# Patient Record
Sex: FEMALE | Race: WHITE | NOT HISPANIC OR LATINO | Employment: OTHER
[De-identification: names, ages, dates, MRNs, and addresses within clinical notes are randomized per-mention and may not be internally consistent; named-entity substitution may affect disease eponyms.]

---

## 2017-03-16 ENCOUNTER — RECORDS - HEALTHEAST (OUTPATIENT)
Dept: ADMINISTRATIVE | Facility: OTHER | Age: 82
End: 2017-03-16

## 2017-03-17 ENCOUNTER — HOSPITAL ENCOUNTER (OUTPATIENT)
Dept: CT IMAGING | Facility: HOSPITAL | Age: 82
Discharge: HOME | End: 2017-03-17

## 2017-03-17 DIAGNOSIS — R10.32 LLQ ABDOMINAL PAIN: ICD-10-CM

## 2017-03-17 DIAGNOSIS — K57.92 DIVERTICULITIS: ICD-10-CM

## 2017-09-26 ENCOUNTER — RECORDS - HEALTHEAST (OUTPATIENT)
Dept: ADMINISTRATIVE | Facility: OTHER | Age: 82
End: 2017-09-26

## 2017-09-29 ENCOUNTER — HOSPITAL ENCOUNTER (OUTPATIENT)
Dept: CT IMAGING | Facility: HOSPITAL | Age: 82
Discharge: HOME | End: 2017-09-29

## 2017-09-29 DIAGNOSIS — K57.32 DIVERTICULITIS LARGE INTESTINE: ICD-10-CM

## 2018-06-26 ENCOUNTER — RECORDS - HEALTHEAST (OUTPATIENT)
Dept: ADMINISTRATIVE | Facility: OTHER | Age: 83
End: 2018-06-26

## 2018-06-29 ENCOUNTER — HOSPITAL ENCOUNTER (OUTPATIENT)
Dept: CT IMAGING | Facility: HOSPITAL | Age: 83
Discharge: HOME | End: 2018-06-29

## 2018-06-29 DIAGNOSIS — M51.16 LUMBAR DISC DISEASE WITH RADICULOPATHY: ICD-10-CM

## 2018-07-18 ENCOUNTER — RECORDS - HEALTHEAST (OUTPATIENT)
Dept: ADMINISTRATIVE | Facility: OTHER | Age: 83
End: 2018-07-18

## 2018-07-20 ENCOUNTER — HOSPITAL ENCOUNTER (OUTPATIENT)
Dept: CARDIOLOGY | Facility: HOSPITAL | Age: 83
Discharge: HOME | End: 2018-07-20

## 2018-07-20 DIAGNOSIS — M79.89 SWELLING OF LIMB: ICD-10-CM

## 2018-07-20 DIAGNOSIS — R06.09 OTHER FORMS OF DYSPNEA: ICD-10-CM

## 2018-07-20 DIAGNOSIS — R06.09 DOE (DYSPNEA ON EXERTION): ICD-10-CM

## 2018-07-20 ASSESSMENT — MIFFLIN-ST. JEOR: SCORE: 1359.83

## 2018-07-23 LAB
BSA FOR ECHO PROCEDURE: 2.07 M2
CV ECHO HEIGHT: 65 IN
CV ECHO WEIGHT: 207 LBS
DOP CALC LVOT AREA: 3.14 CM2
DOP CALC LVOT DIAMETER: 2 CM
DOP CALC LVOT PEAK VEL: 104 CM/S
DOP CALC LVOT STROKE VOLUME: 71.3 CM3
DOP CALCLVOT PEAK VEL VTI: 22.7 CM
ECHO EJECTION FRACTION ESTIMATED: 65 %
FRACTIONAL SHORTENING: 35.2 % (ref 28–44)
INTERVENTRICULAR SEPTUM IN END DIASTOLE: 1 CM (ref 0.6–0.9)
IVS/PW RATIO: 1
LA AREA 1: 17.3 CM2
LA AREA 2: 16.3 CM2
LEFT ATRIUM LENGTH: 5.4 CM
LEFT ATRIUM VOLUME INDEX: 21.4 ML/M2
LEFT ATRIUM VOLUME: 44.4 ML
LEFT VENTRICLE MASS INDEX: 59.5 G/M2
LEFT VENTRICULAR INTERNAL DIMENSION IN DIASTOLE: 3.92 CM (ref 3.8–5.2)
LEFT VENTRICULAR INTERNAL DIMENSION IN SYSTOLE: 2.54 CM (ref 2.2–3.5)
LEFT VENTRICULAR MASS: 123.1 G
LEFT VENTRICULAR OUTFLOW TRACT MEAN GRADIENT: 2 MMHG
LEFT VENTRICULAR OUTFLOW TRACT MEAN VELOCITY: 66.5 CM/S
LEFT VENTRICULAR OUTFLOW TRACT PEAK GRADIENT: 4 MMHG
LEFT VENTRICULAR POSTERIOR WALL IN END DIASTOLE: 1 CM (ref 0.6–0.9)
LV STROKE VOLUME INDEX: 34.4 ML/M2
MITRAL VALVE DECELERATION SLOPE: 4490 MM/S2
MITRAL VALVE E/A RATIO: 0.8
MITRAL VALVE PRESSURE HALF-TIME: 73 MS
MV AVERAGE E/E' RATIO: 11.9 CM/S
MV DECELERATION TIME: 303 MS
MV E'TISSUE VEL-LAT: 6.09 CM/S
MV E'TISSUE VEL-MED: 9.09 CM/S
MV LATERAL E/E' RATIO: 14.8
MV MEDIAL E/E' RATIO: 9.9
MV PEAK A VELOCITY: 120 CM/S
MV PEAK E VELOCITY: 90.3 CM/S
MV VALVE AREA PRESSURE 1/2 METHOD: 3 CM2
NUC REST DIASTOLIC VOLUME INDEX: 3312 LBS
NUC REST SYSTOLIC VOLUME INDEX: 65 IN
TRICUSPID VALVE ANULAR PLANE SYSTOLIC EXCURSION: 3 CM

## 2021-05-25 ENCOUNTER — RECORDS - HEALTHEAST (OUTPATIENT)
Dept: ADMINISTRATIVE | Facility: CLINIC | Age: 86
End: 2021-05-25

## 2021-05-26 ENCOUNTER — RECORDS - HEALTHEAST (OUTPATIENT)
Dept: ADMINISTRATIVE | Facility: CLINIC | Age: 86
End: 2021-05-26

## 2021-05-27 ENCOUNTER — RECORDS - HEALTHEAST (OUTPATIENT)
Dept: ADMINISTRATIVE | Facility: CLINIC | Age: 86
End: 2021-05-27

## 2021-05-28 ENCOUNTER — RECORDS - HEALTHEAST (OUTPATIENT)
Dept: ADMINISTRATIVE | Facility: CLINIC | Age: 86
End: 2021-05-28

## 2021-05-29 ENCOUNTER — RECORDS - HEALTHEAST (OUTPATIENT)
Dept: ADMINISTRATIVE | Facility: CLINIC | Age: 86
End: 2021-05-29

## 2021-05-30 ENCOUNTER — RECORDS - HEALTHEAST (OUTPATIENT)
Dept: ADMINISTRATIVE | Facility: CLINIC | Age: 86
End: 2021-05-30

## 2021-05-31 ENCOUNTER — RECORDS - HEALTHEAST (OUTPATIENT)
Dept: ADMINISTRATIVE | Facility: CLINIC | Age: 86
End: 2021-05-31

## 2021-06-01 ENCOUNTER — RECORDS - HEALTHEAST (OUTPATIENT)
Dept: ADMINISTRATIVE | Facility: CLINIC | Age: 86
End: 2021-06-01

## 2021-06-01 VITALS — WEIGHT: 207 LBS | BODY MASS INDEX: 34.49 KG/M2 | HEIGHT: 65 IN

## 2021-06-02 ENCOUNTER — RECORDS - HEALTHEAST (OUTPATIENT)
Dept: ADMINISTRATIVE | Facility: CLINIC | Age: 86
End: 2021-06-02

## 2021-06-16 PROBLEM — N17.9 AKI (ACUTE KIDNEY INJURY) (H): Status: ACTIVE | Noted: 2017-09-21

## 2021-06-16 PROBLEM — K57.92 DIVERTICULITIS: Status: ACTIVE | Noted: 2017-09-21

## 2021-06-16 PROBLEM — E87.1 HYPONATREMIA: Status: ACTIVE | Noted: 2017-09-21

## 2021-06-16 PROBLEM — D72.829 LEUKOCYTOSIS, UNSPECIFIED TYPE: Status: ACTIVE | Noted: 2017-09-21

## 2021-07-13 ENCOUNTER — RECORDS - HEALTHEAST (OUTPATIENT)
Dept: ADMINISTRATIVE | Facility: CLINIC | Age: 86
End: 2021-07-13

## 2021-07-21 ENCOUNTER — RECORDS - HEALTHEAST (OUTPATIENT)
Dept: ADMINISTRATIVE | Facility: CLINIC | Age: 86
End: 2021-07-21

## 2022-08-03 ENCOUNTER — TRANSITIONAL CARE UNIT VISIT (OUTPATIENT)
Dept: GERIATRICS | Facility: CLINIC | Age: 87
End: 2022-08-03
Payer: COMMERCIAL

## 2022-08-03 ENCOUNTER — LAB REQUISITION (OUTPATIENT)
Dept: LAB | Facility: CLINIC | Age: 87
End: 2022-08-03
Payer: COMMERCIAL

## 2022-08-03 VITALS
SYSTOLIC BLOOD PRESSURE: 120 MMHG | DIASTOLIC BLOOD PRESSURE: 67 MMHG | HEART RATE: 99 BPM | TEMPERATURE: 97.3 F | OXYGEN SATURATION: 93 % | RESPIRATION RATE: 16 BRPM

## 2022-08-03 DIAGNOSIS — I10 ESSENTIAL (PRIMARY) HYPERTENSION: ICD-10-CM

## 2022-08-03 DIAGNOSIS — Z86.73 HISTORY OF TIA (TRANSIENT ISCHEMIC ATTACK) AND STROKE: ICD-10-CM

## 2022-08-03 DIAGNOSIS — G31.84 MILD COGNITIVE IMPAIRMENT: ICD-10-CM

## 2022-08-03 DIAGNOSIS — N18.30 STAGE 3 CHRONIC KIDNEY DISEASE, UNSPECIFIED WHETHER STAGE 3A OR 3B CKD (H): ICD-10-CM

## 2022-08-03 DIAGNOSIS — I10 HYPERTENSION, UNSPECIFIED TYPE: ICD-10-CM

## 2022-08-03 DIAGNOSIS — N30.00 ACUTE CYSTITIS WITHOUT HEMATURIA: Primary | ICD-10-CM

## 2022-08-03 PROBLEM — I71.40 AAA (ABDOMINAL AORTIC ANEURYSM) (H): Status: ACTIVE | Noted: 2022-08-03

## 2022-08-03 PROBLEM — E53.8 B12 DEFICIENCY: Status: ACTIVE | Noted: 2022-02-23

## 2022-08-03 PROBLEM — M26.629 TMJPDS (TEMPOROMANDIBULAR JOINT PAIN DYSFUNCTION SYNDROME): Status: ACTIVE | Noted: 2022-08-03

## 2022-08-03 PROBLEM — E66.9 OBESITY: Status: ACTIVE | Noted: 2022-08-03

## 2022-08-03 PROBLEM — N36.8 PROLAPSED URETHRAL MUCOSA: Status: ACTIVE | Noted: 2021-04-13

## 2022-08-03 PROBLEM — N81.10 FEMALE BLADDER PROLAPSE, ACQUIRED: Status: ACTIVE | Noted: 2022-08-03

## 2022-08-03 PROBLEM — M47.816 DJD (DEGENERATIVE JOINT DISEASE), LUMBAR: Status: ACTIVE | Noted: 2022-08-03

## 2022-08-03 PROBLEM — N95.2 POSTMENOPAUSAL ATROPHIC VAGINITIS: Status: ACTIVE | Noted: 2021-04-13

## 2022-08-03 PROBLEM — R60.0 EDEMA OF LEFT LOWER LEG DUE TO PERIPHERAL VENOUS INSUFFICIENCY: Status: ACTIVE | Noted: 2018-07-25

## 2022-08-03 PROBLEM — H35.30 MACULAR DEGENERATION: Status: ACTIVE | Noted: 2022-08-03

## 2022-08-03 PROBLEM — G45.9 TIA (TRANSIENT ISCHEMIC ATTACK): Status: ACTIVE | Noted: 2022-08-03

## 2022-08-03 PROBLEM — R76.11 POSITIVE PPD: Status: ACTIVE | Noted: 2022-08-03

## 2022-08-03 PROBLEM — K11.20 SIALADENITIS: Status: ACTIVE | Noted: 2022-08-03

## 2022-08-03 PROBLEM — E78.00 PURE HYPERCHOLESTEROLEMIA: Status: ACTIVE | Noted: 2022-08-03

## 2022-08-03 PROBLEM — I63.9 CEREBRAL INFARCTION (H): Status: ACTIVE | Noted: 2022-08-03

## 2022-08-03 PROBLEM — F41.9 ANXIETY: Status: ACTIVE | Noted: 2022-08-03

## 2022-08-03 PROBLEM — J30.9 ALLERGIC RHINITIS: Status: ACTIVE | Noted: 2022-08-03

## 2022-08-03 PROBLEM — I87.2 EDEMA OF LEFT LOWER LEG DUE TO PERIPHERAL VENOUS INSUFFICIENCY: Status: ACTIVE | Noted: 2018-07-25

## 2022-08-03 PROBLEM — I72.3 ANEURYSM OF RIGHT ILIAC ARTERY (H): Status: ACTIVE | Noted: 2019-11-10

## 2022-08-03 PROBLEM — L57.0 ACTINIC KERATOSIS: Status: ACTIVE | Noted: 2022-08-03

## 2022-08-03 PROBLEM — R73.03 PREDIABETES: Status: ACTIVE | Noted: 2022-08-03

## 2022-08-03 PROCEDURE — 99309 SBSQ NF CARE MODERATE MDM 30: CPT | Performed by: NURSE PRACTITIONER

## 2022-08-03 RX ORDER — MENTHOL AND METHYL SALICYLATE 10; 30 G/100G; G/100G
CREAM TOPICAL 4 TIMES DAILY PRN
COMMUNITY
Start: 2022-11-15

## 2022-08-03 RX ORDER — UREA 10 %
500 LOTION (ML) TOPICAL DAILY
COMMUNITY
Start: 2022-11-14

## 2022-08-03 RX ORDER — GABAPENTIN 300 MG/1
300 CAPSULE ORAL 2 TIMES DAILY
COMMUNITY
Start: 2022-11-14

## 2022-08-03 RX ORDER — POTASSIUM CHLORIDE 1.5 G/1.58G
20 POWDER, FOR SOLUTION ORAL DAILY
COMMUNITY

## 2022-08-03 RX ORDER — ANTIOX #8/OM3/DHA/EPA/LUT/ZEAX 250-2.5 MG
1 CAPSULE ORAL 2 TIMES DAILY
COMMUNITY
Start: 2022-11-14

## 2022-08-03 RX ORDER — POLYVINYL ALCOHOL, POVIDONE 14; 6 MG/ML; MG/ML
2 SOLUTION/ DROPS OPHTHALMIC 2 TIMES DAILY
COMMUNITY
Start: 2022-11-15

## 2022-08-03 RX ORDER — FUROSEMIDE 40 MG
40 TABLET ORAL DAILY
COMMUNITY
End: 2023-01-14

## 2022-08-03 RX ORDER — MIRTAZAPINE 30 MG/1
30 TABLET, FILM COATED ORAL AT BEDTIME
COMMUNITY
End: 2023-01-14

## 2022-08-03 RX ORDER — BENZOCAINE/MENTHOL 6 MG-10 MG
LOZENGE MUCOUS MEMBRANE 2 TIMES DAILY
COMMUNITY

## 2022-08-03 NOTE — LETTER
8/3/2022        RE: Rae Yates  [address] [facility] [address]        [facility]    Code Status:  FULL CODE   Visit Type:   Chief Complaint   Patient presents with     Nursing Home Acute     TCU Follow up     Facility:  [facility] ([facility]) [[id number]]           Transitional Care Course: Rae Yates is a 89 year old female who I am seeing today for admit to the TCU.  Patient recently hospitalized on 7/28/2022.  Patient hospitalized at [facility] secondary to UTI.  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  Per the records patient tripped and put her arms out to stop her self and fell forward hitting her head and landing on the left side of her body.  Patient reported crying for help for several hours.  She had had increasing weakness over the last few weeks prior to her fall including poor oral intake and urinary incontinence.  She does have some urinary incontinence at baseline.  She reported her edema was increased in her left leg.    On today's visit patient is sitting up in wheelchair.  Her daughter is present on exam.  She was previously living in an independent apartment.  She does have some underlying cognitive impairment however answers questions appropriately.  Patient with recent fall secondary to weakness and was found to have UTI and hyponatremia.  There was some confusion to the circumstances around her fall.  No syncope.  Patient's hydrochlorothiazide was stopped.  She was treated with fluids and sodium improved.  Urine culture with pansensitive Klebsiella.  She did complete a course of ceftriaxone.  Patient denies any burning or pain with urination on today's visit.  She does have some underlying urinary incontinence.  Essential hypertension.  Blood pressure appears satisfactory.   Her lisinopril was increased during hospitalization and amlodipine added.  Chronic kidney disease stage III.  Creatinine at baseline of 1-1.2.  History of TIA/carotid and iliac aneurysm.  Stable.  Mild cognitive impairment.  Reports some pain in her chin.  She does have some bruising there.  She has bruising along the left side of her body.  Overall body aches and pains.  Continues on Tylenol.  She reports she is eating and drinking well.  She does report some increased pain in that left shoulder with some limitation.    Active Ambulatory Problems     Diagnosis Date Noted     Prolapsed urethral mucosa 04/13/2021     Postmenopausal atrophic vaginitis 04/13/2021     Joint Pain, Localized In The Hip      Hypertension      Diverticulosis      Diverticulitis 09/21/2017     Leukocytosis, unspecified type 09/21/2017     Hyponatremia 09/21/2017     SEGUNDO (acute kidney injury) (H) 09/21/2017     Multiple allergies      CKD (chronic kidney disease) stage 3, GFR 30-59 ml/min (H) 11/09/2018     Chronic pain 09/13/2011     Cerebral infarction (H) 08/03/2022     Plantar fasciitis 04/09/2012     B12 deficiency 02/23/2022     Anxiety 08/03/2022     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     Allergic rhinitis 08/03/2022     Actinic keratosis 08/03/2022     AAA (abdominal aortic aneurysm) (H) 08/03/2022     DJD (degenerative joint disease) of knee 09/19/2012     DJD (degenerative joint disease), lumbar 08/03/2022     Edema of left lower leg due to peripheral venous insufficiency 07/25/2018     Emphysema of lung (H) 02/06/2015     Female bladder prolapse, acquired 08/03/2022     FHx: AAA 11/01/1995     Macular degeneration 08/03/2022     Mild cognitive impairment with memory loss 04/22/2022     Obesity 08/03/2022     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     Prediabetes 08/03/2022     Pulmonary nodule 02/02/2015     Pure hypercholesterolemia 08/03/2022     Right internal carotid artery aneurysm  "10/12/2015     Sciatica of left side 12/04/2013     Sialadenitis 08/03/2022     TIA (transient ischemic attack) 08/03/2022     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    Macrobid  Macrobid       Prednisone Other (See Comments)     Colon bleeding     Sulfamethoxazole-Trimethoprim Dizziness and Shortness Of Breath     Sulfamethoxazole-Trimethoprim [Sulfamethoxazole W/Trimethoprim] Dizziness and Shortness Of Breath     Cephalexin Nausea     NAUSEA HEADACHE, Keflex  NAUSEA HEADACHE  Keflex  NAUSEA HEADACHE  Keflex  NAUSEA HEADACHE  Keflex       Clindamycin Headache     Bloat  HA  heartburn  Bloat  HA  heartburn  Bloat  HA  heartburn     Clopidogrel Unknown and Diarrhea     PLAVIX  EPISTAAXIS  PLAVIX  EPISTAAXIS    PLAVIX  EPISTAAXIS       Clopidogrel Bisulfate [Clopidogrel] Diarrhea     PLAVIX, EPISTAAXIS     Cortisone Other (See Comments)     Other reaction(s): Flushing     Escitalopram Unknown     Other reaction(s): *Unknown     Levofloxacin Nausea     Weak, nausea, diarrhea  Weak, nausea, diarrhea  Weak, nausea, diarrhea     Metronidazole Unknown     Nitrofurantoin Monohyd/M-Cryst [Nitrofurantoin] Unknown     \"Spacey\" \"Funny\" sensation throughout the body/did not tolerate; No Rash.       Nitroimidazoles [Metronidazole] Nausea     FLAGYL, Weak, nausea, diarrhea     Nsaids (Non-Steroidal Anti-Inflammatory Drug) [Nsaids] Dizziness and Nausea     several     Olmesartan Dizziness     BENICAR  BENICAR    BENICAR     Pravastatin Headache and Nausea     Simvastatin Nausea     Weak  Weak    Weak     Rifampin Nausea and Vomiting     With fatigue, loss of " appetite       All Meds and Allergies reviewed in the record at the facility and is the most up-to-date.    Post Discharge Medication Reconciliation Status: discharge medications reconciled and changed, per note/orders  Current Outpatient Medications   Medication Sig     acetaminophen (TYLENOL) 500 MG tablet Take 500 mg by mouth every 4 hours as needed  mg; amt: 500mg PO HS; oral  Special Instructions: Max dose is 4000mg in 24     cyanocobalamin (VITAMIN B-12) 500 MCG tablet Take 500 mcg by mouth daily     furosemide (LASIX) 40 MG tablet Take 40 mg by mouth daily     gabapentin (NEURONTIN) 300 MG capsule Take 300 mg by mouth 2 times daily     hydrocortisone (CORTAID) 1 % external cream Apply topically 2 times daily     lisinopril (PRINIVIL,ZESTRIL) 10 MG tablet [LISINOPRIL (PRINIVIL,ZESTRIL) 10 MG TABLET] Take 1 tablet (10 mg total) by mouth 2 (two) times a day. (Patient taking differently: Take 40 mg by mouth daily)     Menthol-Methyl Salicylate (ICY HOT EXTRA STRENGTH) 10-30 % CREA Externally apply topically 3 times daily as needed     mirtazapine (REMERON) 30 MG tablet Take 30 mg by mouth At Bedtime     Multiple Vitamins-Minerals (PRESERVISION AREDS 2) CAPS Take 1 capsule by mouth 2 times daily preserVision AREDS-2 (vit c,e-le-mrcjf-lutein-zeaxan 250-90-40-1     polyvinyl alcohol-povidone PF (REFRESH) 1.4-0.6 % ophthalmic solution Place 1-2 drops into both eyes every 4 hours as needed for irritation     potassium chloride (KLOR-CON) 20 MEQ packet Take 20 mEq by mouth daily     No current facility-administered medications for this visit.       REVIEW OF SYSTEMS:   Review of Systems  10 point review of systems reviewed.  Pertinent positives in the HPI.    PHYSICAL EXAMINATION:  Physical Exam     Vital signs: /67   Pulse 99   Temp 97.3  F (36.3  C)   Resp 16   SpO2 93%   General: Awake, Alert, =appropriately, follows simple commands, conversant  HEENT:PERRLA, Pink conjunctiva, anicteric sclerae,  moist oral mucosa.  Bruising under her chin.  NECK: Supple  CVS:  S1  S2, without murmur or gallop.   LUNG: Clear to auscultation, No wheezes, rales or rhonci.  BACK: No kyphosis of the thoracic spine  ABDOMEN: Soft, nontender to palpation, with positive bowel sounds  EXTREMITIES: Moves both upper and lower extremities with limitation to the left shoulder, 1+ pedal edema greater on the left, no calf tenderness  SKIN: Bruising all along the left side of her body.  NEUROLOGIC: Intact, pulses palpable  PSYCHIATRIC: Cognitive impairment noted.      Labs:  All labs reviewed in the nursing home record and Epic   @  Lab Results   Component Value Date    WBC 7.8 08/04/2022     Lab Results   Component Value Date    RBC 3.48 08/04/2022     Lab Results   Component Value Date    HGB 10.7 08/04/2022     Lab Results   Component Value Date    HCT 33.4 08/04/2022     Lab Results   Component Value Date    MCV 96 08/04/2022     Lab Results   Component Value Date    MCH 30.7 08/04/2022     Lab Results   Component Value Date    MCHC 32.0 08/04/2022     Lab Results   Component Value Date    RDW 13.9 08/04/2022     Lab Results   Component Value Date     08/04/2022        @Last Comprehensive Metabolic Panel:  Sodium   Date Value Ref Range Status   08/04/2022 135 (L) 136 - 145 mmol/L Final     Potassium   Date Value Ref Range Status   08/04/2022 5.1 3.4 - 5.3 mmol/L Final   10/22/2019 4.4 3.5 - 5.0 mmol/L Final     Chloride   Date Value Ref Range Status   08/04/2022 97 (L) 98 - 107 mmol/L Final   10/22/2019 96 (L) 98 - 107 mmol/L Final     Carbon Dioxide (CO2)   Date Value Ref Range Status   08/04/2022 27 22 - 29 mmol/L Final   10/22/2019 25 22 - 31 mmol/L Final     Anion Gap   Date Value Ref Range Status   08/04/2022 11 7 - 15 mmol/L Final   10/22/2019 12 5 - 18 mmol/L Final     Glucose   Date Value Ref Range Status   08/04/2022 97 70 - 99 mg/dL Final   10/22/2019 115 70 - 125 mg/dL Final     Urea Nitrogen   Date Value Ref Range  Status   08/04/2022 37.7 (H) 8.0 - 23.0 mg/dL Final   10/22/2019 14 8 - 28 mg/dL Final     Creatinine   Date Value Ref Range Status   08/04/2022 1.41 (H) 0.51 - 0.95 mg/dL Final     GFR Estimate   Date Value Ref Range Status   08/04/2022 35 (L) >60 mL/min/1.73m2 Final     Comment:     Effective December 21, 2021 eGFRcr in adults is calculated using the 2021 CKD-EPI creatinine equation which includes age and gender (Eren et al., NEJ, DOI: 10.1056/NRBTob4123588)   10/22/2019 49 (L) >60 mL/min/1.73m2 Final     Calcium   Date Value Ref Range Status   08/04/2022 9.7 (H) 8.2 - 9.6 mg/dL Final     Assessment/plan:      ICD-10-CM    1. Acute cystitis without hematuria  N30.00  patient has completed her oral antibiotics.  Encourage fluids.  She does have some urinary incontinence at baseline.  Follow-up CBC.   2. Stage 3 chronic kidney disease, unspecified whether stage 3a or 3b CKD (H)  N18.30  Follow-up BMP.   3. Hypertension, unspecified type  I10  satisfactory control.   4. History of TIA (transient ischemic attack) and stroke  Z86.73  Speech therapy to eval and treat.   5. Mild cognitive impairment  G31.84  Speech therapy to eval and treat.     Okay for PT, OT, speech therapy to eval and treat.      This note has been dictated using voice recognition software. Any grammatical or context distortions are unintentional and inherent to the software    Electronically signed by: Farzana Garcia CNP           Sincerely,        Farzana Garcia, NP

## 2022-08-04 LAB
ANION GAP SERPL CALCULATED.3IONS-SCNC: 11 MMOL/L (ref 7–15)
BUN SERPL-MCNC: 37.7 MG/DL (ref 8–23)
CALCIUM SERPL-MCNC: 9.7 MG/DL (ref 8.2–9.6)
CHLORIDE SERPL-SCNC: 97 MMOL/L (ref 98–107)
CREAT SERPL-MCNC: 1.41 MG/DL (ref 0.51–0.95)
DEPRECATED HCO3 PLAS-SCNC: 27 MMOL/L (ref 22–29)
ERYTHROCYTE [DISTWIDTH] IN BLOOD BY AUTOMATED COUNT: 13.9 % (ref 10–15)
GFR SERPL CREATININE-BSD FRML MDRD: 35 ML/MIN/1.73M2
GLUCOSE SERPL-MCNC: 97 MG/DL (ref 70–99)
HCT VFR BLD AUTO: 33.4 % (ref 35–47)
HGB BLD-MCNC: 10.7 G/DL (ref 11.7–15.7)
MCH RBC QN AUTO: 30.7 PG (ref 26.5–33)
MCHC RBC AUTO-ENTMCNC: 32 G/DL (ref 31.5–36.5)
MCV RBC AUTO: 96 FL (ref 78–100)
PLATELET # BLD AUTO: 339 10E3/UL (ref 150–450)
POTASSIUM SERPL-SCNC: 5.1 MMOL/L (ref 3.4–5.3)
RBC # BLD AUTO: 3.48 10E6/UL (ref 3.8–5.2)
SODIUM SERPL-SCNC: 135 MMOL/L (ref 136–145)
WBC # BLD AUTO: 7.8 10E3/UL (ref 4–11)

## 2022-08-04 PROCEDURE — 36415 COLL VENOUS BLD VENIPUNCTURE: CPT | Mod: ORL | Performed by: NURSE PRACTITIONER

## 2022-08-04 PROCEDURE — 85027 COMPLETE CBC AUTOMATED: CPT | Mod: ORL | Performed by: NURSE PRACTITIONER

## 2022-08-04 PROCEDURE — P9604 ONE-WAY ALLOW PRORATED TRIP: HCPCS | Mod: ORL | Performed by: NURSE PRACTITIONER

## 2022-08-04 PROCEDURE — 80048 BASIC METABOLIC PNL TOTAL CA: CPT | Mod: ORL | Performed by: NURSE PRACTITIONER

## 2022-08-05 NOTE — PROGRESS NOTES
[facility]    Code Status:  FULL CODE   Visit Type:   Chief Complaint   Patient presents with     Nursing Home Acute     TCU Follow up     Facility:  [facility] ([facility]) [[id number]]           Transitional Care Course: Rae Yates is a 89 year old female who I am seeing today for admit to the TCU.  Patient recently hospitalized on 7/28/2022.  Patient hospitalized at [facility] secondary to UTI.  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  Per the records patient tripped and put her arms out to stop her self and fell forward hitting her head and landing on the left side of her body.  Patient reported crying for help for several hours.  She had had increasing weakness over the last few weeks prior to her fall including poor oral intake and urinary incontinence.  She does have some urinary incontinence at baseline.  She reported her edema was increased in her left leg.    On today's visit patient is sitting up in wheelchair.  Her daughter is present on exam.  She was previously living in an independent apartment.  She does have some underlying cognitive impairment however answers questions appropriately.  Patient with recent fall secondary to weakness and was found to have UTI and hyponatremia.  There was some confusion to the circumstances around her fall.  No syncope.  Patient's hydrochlorothiazide was stopped.  She was treated with fluids and sodium improved.  Urine culture with pansensitive Klebsiella.  She did complete a course of ceftriaxone.  Patient denies any burning or pain with urination on today's visit.  She does have some underlying urinary incontinence.  Essential hypertension.  Blood pressure appears satisfactory.  Her lisinopril was increased during hospitalization and amlodipine added.  Chronic  kidney disease stage III.  Creatinine at baseline of 1-1.2.  History of TIA/carotid and iliac aneurysm.  Stable.  Mild cognitive impairment.  Reports some pain in her chin.  She does have some bruising there.  She has bruising along the left side of her body.  Overall body aches and pains.  Continues on Tylenol.  She reports she is eating and drinking well.  She does report some increased pain in that left shoulder with some limitation.    Active Ambulatory Problems     Diagnosis Date Noted     Prolapsed urethral mucosa 04/13/2021     Postmenopausal atrophic vaginitis 04/13/2021     Joint Pain, Localized In The Hip      Hypertension      Diverticulosis      Diverticulitis 09/21/2017     Leukocytosis, unspecified type 09/21/2017     Hyponatremia 09/21/2017     SEGUNDO (acute kidney injury) (H) 09/21/2017     Multiple allergies      CKD (chronic kidney disease) stage 3, GFR 30-59 ml/min (H) 11/09/2018     Chronic pain 09/13/2011     Cerebral infarction (H) 08/03/2022     Plantar fasciitis 04/09/2012     B12 deficiency 02/23/2022     Anxiety 08/03/2022     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     Allergic rhinitis 08/03/2022     Actinic keratosis 08/03/2022     AAA (abdominal aortic aneurysm) (H) 08/03/2022     DJD (degenerative joint disease) of knee 09/19/2012     DJD (degenerative joint disease), lumbar 08/03/2022     Edema of left lower leg due to peripheral venous insufficiency 07/25/2018     Emphysema of lung (H) 02/06/2015     Female bladder prolapse, acquired 08/03/2022     FHx: AAA 11/01/1995     Macular degeneration 08/03/2022     Mild cognitive impairment with memory loss 04/22/2022     Obesity 08/03/2022     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     Prediabetes 08/03/2022     Pulmonary nodule 02/02/2015     Pure hypercholesterolemia 08/03/2022     Right internal carotid artery aneurysm 10/12/2015     Sciatica of left side 12/04/2013     Sialadenitis 08/03/2022     TIA  "(transient ischemic attack) 08/03/2022     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    Macrobid  Macrobid       Prednisone Other (See Comments)     Colon bleeding     Sulfamethoxazole-Trimethoprim Dizziness and Shortness Of Breath     Sulfamethoxazole-Trimethoprim [Sulfamethoxazole W/Trimethoprim] Dizziness and Shortness Of Breath     Cephalexin Nausea     NAUSEA HEADACHE, Keflex  NAUSEA HEADACHE  Keflex  NAUSEA HEADACHE  Keflex  NAUSEA HEADACHE  Keflex       Clindamycin Headache     Bloat  HA  heartburn  Bloat  HA  heartburn  Bloat  HA  heartburn     Clopidogrel Unknown and Diarrhea     PLAVIX  EPISTAAXIS  PLAVIX  EPISTAAXIS    PLAVIX  EPISTAAXIS       Clopidogrel Bisulfate [Clopidogrel] Diarrhea     PLAVIX, EPISTAAXIS     Cortisone Other (See Comments)     Other reaction(s): Flushing     Escitalopram Unknown     Other reaction(s): *Unknown     Levofloxacin Nausea     Weak, nausea, diarrhea  Weak, nausea, diarrhea  Weak, nausea, diarrhea     Metronidazole Unknown     Nitrofurantoin Monohyd/M-Cryst [Nitrofurantoin] Unknown     \"Spacey\" \"Funny\" sensation throughout the body/did not tolerate; No Rash.       Nitroimidazoles [Metronidazole] Nausea     FLAGYL, Weak, nausea, diarrhea     Nsaids (Non-Steroidal Anti-Inflammatory Drug) [Nsaids] Dizziness and Nausea     several     Olmesartan Dizziness     BENICAR  BENICAR    BENICAR     Pravastatin Headache and Nausea     Simvastatin Nausea     Weak  Weak    Weak     Rifampin Nausea and Vomiting     With fatigue, loss of appetite       All Meds and Allergies reviewed in the record at the facility and is the " most up-to-date.    Post Discharge Medication Reconciliation Status: discharge medications reconciled and changed, per note/orders  Current Outpatient Medications   Medication Sig     acetaminophen (TYLENOL) 500 MG tablet Take 500 mg by mouth every 4 hours as needed  mg; amt: 500mg PO HS; oral  Special Instructions: Max dose is 4000mg in 24     cyanocobalamin (VITAMIN B-12) 500 MCG tablet Take 500 mcg by mouth daily     furosemide (LASIX) 40 MG tablet Take 40 mg by mouth daily     gabapentin (NEURONTIN) 300 MG capsule Take 300 mg by mouth 2 times daily     hydrocortisone (CORTAID) 1 % external cream Apply topically 2 times daily     lisinopril (PRINIVIL,ZESTRIL) 10 MG tablet [LISINOPRIL (PRINIVIL,ZESTRIL) 10 MG TABLET] Take 1 tablet (10 mg total) by mouth 2 (two) times a day. (Patient taking differently: Take 40 mg by mouth daily)     Menthol-Methyl Salicylate (ICY HOT EXTRA STRENGTH) 10-30 % CREA Externally apply topically 3 times daily as needed     mirtazapine (REMERON) 30 MG tablet Take 30 mg by mouth At Bedtime     Multiple Vitamins-Minerals (PRESERVISION AREDS 2) CAPS Take 1 capsule by mouth 2 times daily preserVision AREDS-2 (vit c,o-ev-qffkp-lutein-zeaxan 250-90-40-1     polyvinyl alcohol-povidone PF (REFRESH) 1.4-0.6 % ophthalmic solution Place 1-2 drops into both eyes every 4 hours as needed for irritation     potassium chloride (KLOR-CON) 20 MEQ packet Take 20 mEq by mouth daily     No current facility-administered medications for this visit.       REVIEW OF SYSTEMS:   Review of Systems  10 point review of systems reviewed.  Pertinent positives in the HPI.    PHYSICAL EXAMINATION:  Physical Exam     Vital signs: /67   Pulse 99   Temp 97.3  F (36.3  C)   Resp 16   SpO2 93%   General: Awake, Alert, =appropriately, follows simple commands, conversant  HEENT:PERRLA, Pink conjunctiva, anicteric sclerae, moist oral mucosa.  Bruising under her chin.  NECK: Supple  CVS:  S1  S2, without murmur or  gallop.   LUNG: Clear to auscultation, No wheezes, rales or rhonci.  BACK: No kyphosis of the thoracic spine  ABDOMEN: Soft, nontender to palpation, with positive bowel sounds  EXTREMITIES: Moves both upper and lower extremities with limitation to the left shoulder, 1+ pedal edema greater on the left, no calf tenderness  SKIN: Bruising all along the left side of her body.  NEUROLOGIC: Intact, pulses palpable  PSYCHIATRIC: Cognitive impairment noted.      Labs:  All labs reviewed in the nursing home record and Epic   @  Lab Results   Component Value Date    WBC 7.8 08/04/2022     Lab Results   Component Value Date    RBC 3.48 08/04/2022     Lab Results   Component Value Date    HGB 10.7 08/04/2022     Lab Results   Component Value Date    HCT 33.4 08/04/2022     Lab Results   Component Value Date    MCV 96 08/04/2022     Lab Results   Component Value Date    MCH 30.7 08/04/2022     Lab Results   Component Value Date    MCHC 32.0 08/04/2022     Lab Results   Component Value Date    RDW 13.9 08/04/2022     Lab Results   Component Value Date     08/04/2022        @Last Comprehensive Metabolic Panel:  Sodium   Date Value Ref Range Status   08/04/2022 135 (L) 136 - 145 mmol/L Final     Potassium   Date Value Ref Range Status   08/04/2022 5.1 3.4 - 5.3 mmol/L Final   10/22/2019 4.4 3.5 - 5.0 mmol/L Final     Chloride   Date Value Ref Range Status   08/04/2022 97 (L) 98 - 107 mmol/L Final   10/22/2019 96 (L) 98 - 107 mmol/L Final     Carbon Dioxide (CO2)   Date Value Ref Range Status   08/04/2022 27 22 - 29 mmol/L Final   10/22/2019 25 22 - 31 mmol/L Final     Anion Gap   Date Value Ref Range Status   08/04/2022 11 7 - 15 mmol/L Final   10/22/2019 12 5 - 18 mmol/L Final     Glucose   Date Value Ref Range Status   08/04/2022 97 70 - 99 mg/dL Final   10/22/2019 115 70 - 125 mg/dL Final     Urea Nitrogen   Date Value Ref Range Status   08/04/2022 37.7 (H) 8.0 - 23.0 mg/dL Final   10/22/2019 14 8 - 28 mg/dL Final      Creatinine   Date Value Ref Range Status   08/04/2022 1.41 (H) 0.51 - 0.95 mg/dL Final     GFR Estimate   Date Value Ref Range Status   08/04/2022 35 (L) >60 mL/min/1.73m2 Final     Comment:     Effective December 21, 2021 eGFRcr in adults is calculated using the 2021 CKD-EPI creatinine equation which includes age and gender (Eren et al., NE, DOI: 10.1056/QWGCod7831635)   10/22/2019 49 (L) >60 mL/min/1.73m2 Final     Calcium   Date Value Ref Range Status   08/04/2022 9.7 (H) 8.2 - 9.6 mg/dL Final     Assessment/plan:      ICD-10-CM    1. Acute cystitis without hematuria  N30.00  patient has completed her oral antibiotics.  Encourage fluids.  She does have some urinary incontinence at baseline.  Follow-up CBC.   2. Stage 3 chronic kidney disease, unspecified whether stage 3a or 3b CKD (H)  N18.30  Follow-up BMP.   3. Hypertension, unspecified type  I10  satisfactory control.   4. History of TIA (transient ischemic attack) and stroke  Z86.73  Speech therapy to eval and treat.   5. Mild cognitive impairment  G31.84  Speech therapy to eval and treat.     Okay for PT, OT, speech therapy to eval and treat.      This note has been dictated using voice recognition software. Any grammatical or context distortions are unintentional and inherent to the software    Electronically signed by: Farzana Garcia, CNP

## 2022-08-09 ENCOUNTER — TRANSITIONAL CARE UNIT VISIT (OUTPATIENT)
Dept: GERIATRICS | Facility: CLINIC | Age: 87
End: 2022-08-09
Payer: COMMERCIAL

## 2022-08-09 VITALS
HEART RATE: 80 BPM | WEIGHT: 175.6 LBS | TEMPERATURE: 97.5 F | RESPIRATION RATE: 14 BRPM | BODY MASS INDEX: 29.22 KG/M2 | SYSTOLIC BLOOD PRESSURE: 144 MMHG | OXYGEN SATURATION: 96 % | DIASTOLIC BLOOD PRESSURE: 75 MMHG

## 2022-08-09 DIAGNOSIS — N18.30 STAGE 3 CHRONIC KIDNEY DISEASE, UNSPECIFIED WHETHER STAGE 3A OR 3B CKD (H): ICD-10-CM

## 2022-08-09 DIAGNOSIS — I10 HYPERTENSION, UNSPECIFIED TYPE: ICD-10-CM

## 2022-08-09 DIAGNOSIS — N30.00 ACUTE CYSTITIS WITHOUT HEMATURIA: Primary | ICD-10-CM

## 2022-08-09 DIAGNOSIS — S30.810A EXCORIATION OF BUTTOCK, INITIAL ENCOUNTER: ICD-10-CM

## 2022-08-09 PROCEDURE — 99309 SBSQ NF CARE MODERATE MDM 30: CPT | Performed by: NURSE PRACTITIONER

## 2022-08-09 NOTE — LETTER
8/9/2022        RE: Rae Yates  [address] [place] [address]        [facility]    Code Status:  FULL CODE   Visit Type:   Chief Complaint   Patient presents with     Nursing Home Acute     TCU Follow up     Facility:  [facility] ([facility]) [[id number]]           Transitional Care Course: Rae Yates is a 89 year old female who I am seeing today for follow-up on the TCU.  Patient recently hospitalized on 7/28/2022.  Patient hospitalized at [facility] secondary to UTI.  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  Per the records patient tripped and put her arms out to stop her self and fell forward hitting her head and landing on the left side of her body.  Patient reported crying for help for several hours.  She had had increasing weakness over the last few weeks prior to her fall including poor oral intake and urinary incontinence.  She does have some urinary incontinence at baseline.  She reported her edema was increased in her left leg.    On today's visit patient is sitting up in wheelchair.  Her daughter is present on exam. Pt with recent UTI. Pt has completed her antibiotics. She is voiding adequately. Pt denies any burning or pain with urination.  Patient with excoriation to bilateral buttocks.  Denuded skin noted.  No yeast present.  Calmoseptine applied today.  Hist patient with mild underlying cognitive impairment.  ory of underlying urinary incontinence.  Hypertension.  Blood pressure satisfactory.  Stage III chronic kidney disease.  Creatinine stable.  Bruising to her chin and left side improving.  She continues on Tylenol for pain.    Active Ambulatory Problems     Diagnosis Date Noted     Prolapsed urethral mucosa 04/13/2021     Postmenopausal atrophic vaginitis 04/13/2021      "Joint Pain, Localized In The Hip      Hypertension      Diverticulosis      Diverticulitis 09/21/2017     Leukocytosis, unspecified type 09/21/2017     Hyponatremia 09/21/2017     SEGUNDO (acute kidney injury) (H) 09/21/2017     Multiple allergies      CKD (chronic kidney disease) stage 3, GFR 30-59 ml/min (H) 11/09/2018     Chronic pain 09/13/2011     Cerebral infarction (H) 08/03/2022     Plantar fasciitis 04/09/2012     B12 deficiency 02/23/2022     Anxiety 08/03/2022     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     Allergic rhinitis 08/03/2022     Actinic keratosis 08/03/2022     AAA (abdominal aortic aneurysm) (H) 08/03/2022     DJD (degenerative joint disease) of knee 09/19/2012     DJD (degenerative joint disease), lumbar 08/03/2022     Edema of left lower leg due to peripheral venous insufficiency 07/25/2018     Emphysema of lung (H) 02/06/2015     Female bladder prolapse, acquired 08/03/2022     FHx: AAA 11/01/1995     Macular degeneration 08/03/2022     Mild cognitive impairment with memory loss 04/22/2022     Obesity 08/03/2022     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     Prediabetes 08/03/2022     Pulmonary nodule 02/02/2015     Pure hypercholesterolemia 08/03/2022     Right internal carotid artery aneurysm 10/12/2015     Sciatica of left side 12/04/2013     Sialadenitis 08/03/2022     TIA (transient ischemic attack) 08/03/2022     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    Macrobid  Macrobid       Prednisone Other (See Comments)     Colon bleeding     Sulfamethoxazole-Trimethoprim Dizziness and " "Shortness Of Breath     Sulfamethoxazole-Trimethoprim [Sulfamethoxazole W/Trimethoprim] Dizziness and Shortness Of Breath     Cephalexin Nausea     NAUSEA HEADACHE, Keflex  NAUSEA HEADACHE  Keflex  NAUSEA HEADACHE  Keflex  NAUSEA HEADACHE  Keflex       Clindamycin Headache     Bloat  HA  heartburn  Bloat  HA  heartburn  Bloat  HA  heartburn     Clopidogrel Unknown and Diarrhea     PLAVIX  EPISTAAXIS  PLAVIX  EPISTAAXIS    PLAVIX  EPISTAAXIS       Clopidogrel Bisulfate [Clopidogrel] Diarrhea     PLAVIX, EPISTAAXIS     Cortisone Other (See Comments)     Other reaction(s): Flushing     Escitalopram Unknown     Other reaction(s): *Unknown     Levofloxacin Nausea     Weak, nausea, diarrhea  Weak, nausea, diarrhea  Weak, nausea, diarrhea     Metronidazole Unknown     Nitrofurantoin Monohyd/M-Cryst [Nitrofurantoin] Unknown     \"Spacey\" \"Funny\" sensation throughout the body/did not tolerate; No Rash.       Nitroimidazoles [Metronidazole] Nausea     FLAGYL, Weak, nausea, diarrhea     Nsaids (Non-Steroidal Anti-Inflammatory Drug) [Nsaids] Dizziness and Nausea     several     Olmesartan Dizziness     BENICAR  BENICAR    BENICAR     Pravastatin Headache and Nausea     Simvastatin Nausea     Weak  Weak    Weak     Rifampin Nausea and Vomiting     With fatigue, loss of appetite       All Meds and Allergies reviewed in the record at the facility and is the most up-to-date.    Current Outpatient Medications   Medication Sig     acetaminophen (TYLENOL) 500 MG tablet Take 500 mg by mouth every 4 hours as needed  mg; amt: 500mg PO HS; oral  Special Instructions: Max dose is 4000mg in 24     cyanocobalamin (VITAMIN B-12) 500 MCG tablet Take 500 mcg by mouth daily     furosemide (LASIX) 40 MG tablet Take 40 mg by mouth daily     gabapentin (NEURONTIN) 300 MG capsule Take 300 mg by mouth 2 times daily     hydrocortisone (CORTAID) 1 % external cream Apply topically 2 times daily     lisinopril (PRINIVIL,ZESTRIL) 10 MG tablet " [LISINOPRIL (PRINIVIL,ZESTRIL) 10 MG TABLET] Take 1 tablet (10 mg total) by mouth 2 (two) times a day. (Patient taking differently: Take 40 mg by mouth daily)     Menthol-Methyl Salicylate (ICY HOT EXTRA STRENGTH) 10-30 % CREA Externally apply topically 3 times daily as needed     mirtazapine (REMERON) 30 MG tablet Take 30 mg by mouth At Bedtime     Multiple Vitamins-Minerals (PRESERVISION AREDS 2) CAPS Take 1 capsule by mouth 2 times daily preserVision AREDS-2 (vit c,g-vr-adwic-lutein-zeaxan 250-90-40-1     polyvinyl alcohol-povidone PF (REFRESH) 1.4-0.6 % ophthalmic solution Place 1-2 drops into both eyes every 4 hours as needed for irritation     potassium chloride (KLOR-CON) 20 MEQ packet Take 20 mEq by mouth daily     No current facility-administered medications for this visit.       REVIEW OF SYSTEMS:   Review of Systems  10 point review of systems reviewed.  Pertinent positives in the HPI.    PHYSICAL EXAMINATION:  Physical Exam     Vital signs: BP (!) 144/75   Pulse 80   Temp 97.5  F (36.4  C)   Resp 14   Wt 79.7 kg (175 lb 9.6 oz)   SpO2 96%   BMI 29.22 kg/m    General: Awake, Alert,  conversant  HEENT:Pink conjunctiva, anicteric sclerae, moist oral mucosa.  Bruising under her chin improving.  NECK: Supple  CVS:  S1  S2, without murmur or gallop.   LUNG: Clear to auscultation, No wheezes, rales or rhonci.  BACK: No kyphosis of the thoracic spine  ABDOMEN: Soft,  with positive bowel sounds  EXTREMITIES: Moves both upper and lower extremities  1+ pedal edema greater on the left, no calf tenderness  SKIN: Bruising all along the left side of her body improving.  She has excoriation of bilateral buttocks.  Denuded skin noted.    NEUROLOGIC: Intact, pulses palpable  PSYCHIATRIC: Cognitive impairment noted.      Labs:  All labs reviewed in the nursing home record and [employer]   @  Lab Results   Component Value Date    WBC 7.8 08/04/2022     Lab Results   Component Value Date    RBC 3.48 08/04/2022     Lab  Results   Component Value Date    HGB 10.7 08/04/2022     Lab Results   Component Value Date    HCT 33.4 08/04/2022     Lab Results   Component Value Date    MCV 96 08/04/2022     Lab Results   Component Value Date    MCH 30.7 08/04/2022     Lab Results   Component Value Date    MCHC 32.0 08/04/2022     Lab Results   Component Value Date    RDW 13.9 08/04/2022     Lab Results   Component Value Date     08/04/2022        @Last Comprehensive Metabolic Panel:  Sodium   Date Value Ref Range Status   08/04/2022 135 (L) 136 - 145 mmol/L Final     Potassium   Date Value Ref Range Status   08/04/2022 5.1 3.4 - 5.3 mmol/L Final   10/22/2019 4.4 3.5 - 5.0 mmol/L Final     Chloride   Date Value Ref Range Status   08/04/2022 97 (L) 98 - 107 mmol/L Final   10/22/2019 96 (L) 98 - 107 mmol/L Final     Carbon Dioxide (CO2)   Date Value Ref Range Status   08/04/2022 27 22 - 29 mmol/L Final   10/22/2019 25 22 - 31 mmol/L Final     Anion Gap   Date Value Ref Range Status   08/04/2022 11 7 - 15 mmol/L Final   10/22/2019 12 5 - 18 mmol/L Final     Glucose   Date Value Ref Range Status   08/04/2022 97 70 - 99 mg/dL Final   10/22/2019 115 70 - 125 mg/dL Final     Urea Nitrogen   Date Value Ref Range Status   08/04/2022 37.7 (H) 8.0 - 23.0 mg/dL Final   10/22/2019 14 8 - 28 mg/dL Final     Creatinine   Date Value Ref Range Status   08/04/2022 1.41 (H) 0.51 - 0.95 mg/dL Final     GFR Estimate   Date Value Ref Range Status   08/04/2022 35 (L) >60 mL/min/1.73m2 Final     Comment:     Effective December 21, 2021 eGFRcr in adults is calculated using the 2021 CKD-EPI creatinine equation which includes age and gender (Eren wagoner al., NEJM, DOI: 10.1056/GBNSsc4574538)   10/22/2019 49 (L) >60 mL/min/1.73m2 Final     Calcium   Date Value Ref Range Status   08/04/2022 9.7 (H) 8.2 - 9.6 mg/dL Final     Assessment/plan:      ICD-10-CM    1. Acute cystitis without hematuria  N30.00  patient completed her oral antibiotics.  Urinary incontinence at  baseline.  Encourage fluids.   2. Stage 3 chronic kidney disease, unspecified whether stage 3a or 3b CKD (H)  N18.30  Creatinine at baseline.  Continue to monitor.   3. Hypertension, unspecified type  I10  satisfactory control.   4. Excoriation of buttock, initial encounter  S30.810A  Apply Calmoseptine every shift.         This note has been dictated using voice recognition software. Any grammatical or context distortions are unintentional and inherent to the software    Electronically signed by: Farzana Garcia CNP           Sincerely,        Farzana Garcia, NP

## 2022-08-10 NOTE — PROGRESS NOTES
[facility]    Code Status:  FULL CODE   Visit Type:   Chief Complaint   Patient presents with     Nursing Home Acute     TCU Follow up     Facility:  [facility] ([facility]) [[id number]]           Transitional Care Course: Rae Yates is a 89 year old female who I am seeing today for follow-up on the TCU.  Patient recently hospitalized on 7/28/2022.  Patient hospitalized at [facility] secondary to UTI.  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  Per the records patient tripped and put her arms out to stop her self and fell forward hitting her head and landing on the left side of her body.  Patient reported crying for help for several hours.  She had had increasing weakness over the last few weeks prior to her fall including poor oral intake and urinary incontinence.  She does have some urinary incontinence at baseline.  She reported her edema was increased in her left leg.    On today's visit patient is sitting up in wheelchair.  Her daughter is present on exam. Pt with recent UTI. Pt has completed her antibiotics. She is voiding adequately. Pt denies any burning or pain with urination.  Patient with excoriation to bilateral buttocks.  Denuded skin noted.  No yeast present.  Calmoseptine applied today.  Hist patient with mild underlying cognitive impairment.  ory of underlying urinary incontinence.  Hypertension.  Blood pressure satisfactory.  Stage III chronic kidney disease.  Creatinine stable.  Bruising to her chin and left side improving.  She continues on Tylenol for pain.    Active Ambulatory Problems     Diagnosis Date Noted     Prolapsed urethral mucosa 04/13/2021     Postmenopausal atrophic vaginitis 04/13/2021     Joint Pain, Localized In The Hip      Hypertension      Diverticulosis      Diverticulitis  "09/21/2017     Leukocytosis, unspecified type 09/21/2017     Hyponatremia 09/21/2017     SEGUNDO (acute kidney injury) (H) 09/21/2017     Multiple allergies      CKD (chronic kidney disease) stage 3, GFR 30-59 ml/min (H) 11/09/2018     Chronic pain 09/13/2011     Cerebral infarction (H) 08/03/2022     Plantar fasciitis 04/09/2012     B12 deficiency 02/23/2022     Anxiety 08/03/2022     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     Allergic rhinitis 08/03/2022     Actinic keratosis 08/03/2022     AAA (abdominal aortic aneurysm) (H) 08/03/2022     DJD (degenerative joint disease) of knee 09/19/2012     DJD (degenerative joint disease), lumbar 08/03/2022     Edema of left lower leg due to peripheral venous insufficiency 07/25/2018     Emphysema of lung (H) 02/06/2015     Female bladder prolapse, acquired 08/03/2022     FHx: AAA 11/01/1995     Macular degeneration 08/03/2022     Mild cognitive impairment with memory loss 04/22/2022     Obesity 08/03/2022     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     Prediabetes 08/03/2022     Pulmonary nodule 02/02/2015     Pure hypercholesterolemia 08/03/2022     Right internal carotid artery aneurysm 10/12/2015     Sciatica of left side 12/04/2013     Sialadenitis 08/03/2022     TIA (transient ischemic attack) 08/03/2022     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    Macrobid  Macrobid       Prednisone Other (See Comments)     Colon bleeding     Sulfamethoxazole-Trimethoprim Dizziness and Shortness Of Breath     Sulfamethoxazole-Trimethoprim [Sulfamethoxazole W/Trimethoprim] Dizziness " "and Shortness Of Breath     Cephalexin Nausea     NAUSEA HEADACHE, Keflex  NAUSEA HEADACHE  Keflex  NAUSEA HEADACHE  Keflex  NAUSEA HEADACHE  Keflex       Clindamycin Headache     Bloat  HA  heartburn  Bloat  HA  heartburn  Bloat  HA  heartburn     Clopidogrel Unknown and Diarrhea     PLAVIX  EPISTAAXIS  PLAVIX  EPISTAAXIS    PLAVIX  EPISTAAXIS       Clopidogrel Bisulfate [Clopidogrel] Diarrhea     PLAVIX, EPISTAAXIS     Cortisone Other (See Comments)     Other reaction(s): Flushing     Escitalopram Unknown     Other reaction(s): *Unknown     Levofloxacin Nausea     Weak, nausea, diarrhea  Weak, nausea, diarrhea  Weak, nausea, diarrhea     Metronidazole Unknown     Nitrofurantoin Monohyd/M-Cryst [Nitrofurantoin] Unknown     \"Spacey\" \"Funny\" sensation throughout the body/did not tolerate; No Rash.       Nitroimidazoles [Metronidazole] Nausea     FLAGYL, Weak, nausea, diarrhea     Nsaids (Non-Steroidal Anti-Inflammatory Drug) [Nsaids] Dizziness and Nausea     several     Olmesartan Dizziness     BENICAR  BENICAR    BENICAR     Pravastatin Headache and Nausea     Simvastatin Nausea     Weak  Weak    Weak     Rifampin Nausea and Vomiting     With fatigue, loss of appetite       All Meds and Allergies reviewed in the record at the facility and is the most up-to-date.    Current Outpatient Medications   Medication Sig     acetaminophen (TYLENOL) 500 MG tablet Take 500 mg by mouth every 4 hours as needed  mg; amt: 500mg PO HS; oral  Special Instructions: Max dose is 4000mg in 24     cyanocobalamin (VITAMIN B-12) 500 MCG tablet Take 500 mcg by mouth daily     furosemide (LASIX) 40 MG tablet Take 40 mg by mouth daily     gabapentin (NEURONTIN) 300 MG capsule Take 300 mg by mouth 2 times daily     hydrocortisone (CORTAID) 1 % external cream Apply topically 2 times daily     lisinopril (PRINIVIL,ZESTRIL) 10 MG tablet [LISINOPRIL (PRINIVIL,ZESTRIL) 10 MG TABLET] Take 1 tablet (10 mg total) by mouth 2 (two) times a day. " (Patient taking differently: Take 40 mg by mouth daily)     Menthol-Methyl Salicylate (ICY HOT EXTRA STRENGTH) 10-30 % CREA Externally apply topically 3 times daily as needed     mirtazapine (REMERON) 30 MG tablet Take 30 mg by mouth At Bedtime     Multiple Vitamins-Minerals (PRESERVISION AREDS 2) CAPS Take 1 capsule by mouth 2 times daily preserVision AREDS-2 (vit c,w-fp-oguqg-lutein-zeaxan 250-90-40-1     polyvinyl alcohol-povidone PF (REFRESH) 1.4-0.6 % ophthalmic solution Place 1-2 drops into both eyes every 4 hours as needed for irritation     potassium chloride (KLOR-CON) 20 MEQ packet Take 20 mEq by mouth daily     No current facility-administered medications for this visit.       REVIEW OF SYSTEMS:   Review of Systems  10 point review of systems reviewed.  Pertinent positives in the HPI.    PHYSICAL EXAMINATION:  Physical Exam     Vital signs: BP (!) 144/75   Pulse 80   Temp 97.5  F (36.4  C)   Resp 14   Wt 79.7 kg (175 lb 9.6 oz)   SpO2 96%   BMI 29.22 kg/m    General: Awake, Alert,  conversant  HEENT:Pink conjunctiva, anicteric sclerae, moist oral mucosa.  Bruising under her chin improving.  NECK: Supple  CVS:  S1  S2, without murmur or gallop.   LUNG: Clear to auscultation, No wheezes, rales or rhonci.  BACK: No kyphosis of the thoracic spine  ABDOMEN: Soft,  with positive bowel sounds  EXTREMITIES: Moves both upper and lower extremities  1+ pedal edema greater on the left, no calf tenderness  SKIN: Bruising all along the left side of her body improving.  She has excoriation of bilateral buttocks.  Denuded skin noted.    NEUROLOGIC: Intact, pulses palpable  PSYCHIATRIC: Cognitive impairment noted.      Labs:  All labs reviewed in the nursing home record and [email or web address]   @  Lab Results   Component Value Date    WBC 7.8 08/04/2022     Lab Results   Component Value Date    RBC 3.48 08/04/2022     Lab Results   Component Value Date    HGB 10.7 08/04/2022     Lab Results   Component Value Date    HCT 33.4  08/04/2022     Lab Results   Component Value Date    MCV 96 08/04/2022     Lab Results   Component Value Date    MCH 30.7 08/04/2022     Lab Results   Component Value Date    MCHC 32.0 08/04/2022     Lab Results   Component Value Date    RDW 13.9 08/04/2022     Lab Results   Component Value Date     08/04/2022        @Last Comprehensive Metabolic Panel:  Sodium   Date Value Ref Range Status   08/04/2022 135 (L) 136 - 145 mmol/L Final     Potassium   Date Value Ref Range Status   08/04/2022 5.1 3.4 - 5.3 mmol/L Final   10/22/2019 4.4 3.5 - 5.0 mmol/L Final     Chloride   Date Value Ref Range Status   08/04/2022 97 (L) 98 - 107 mmol/L Final   10/22/2019 96 (L) 98 - 107 mmol/L Final     Carbon Dioxide (CO2)   Date Value Ref Range Status   08/04/2022 27 22 - 29 mmol/L Final   10/22/2019 25 22 - 31 mmol/L Final     Anion Gap   Date Value Ref Range Status   08/04/2022 11 7 - 15 mmol/L Final   10/22/2019 12 5 - 18 mmol/L Final     Glucose   Date Value Ref Range Status   08/04/2022 97 70 - 99 mg/dL Final   10/22/2019 115 70 - 125 mg/dL Final     Urea Nitrogen   Date Value Ref Range Status   08/04/2022 37.7 (H) 8.0 - 23.0 mg/dL Final   10/22/2019 14 8 - 28 mg/dL Final     Creatinine   Date Value Ref Range Status   08/04/2022 1.41 (H) 0.51 - 0.95 mg/dL Final     GFR Estimate   Date Value Ref Range Status   08/04/2022 35 (L) >60 mL/min/1.73m2 Final     Comment:     Effective December 21, 2021 eGFRcr in adults is calculated using the 2021 CKD-EPI creatinine equation which includes age and gender (Eren et al., NEJM, DOI: 10.1056/MYJIfw0454235)   10/22/2019 49 (L) >60 mL/min/1.73m2 Final     Calcium   Date Value Ref Range Status   08/04/2022 9.7 (H) 8.2 - 9.6 mg/dL Final     Assessment/plan:      ICD-10-CM    1. Acute cystitis without hematuria  N30.00  patient completed her oral antibiotics.  Urinary incontinence at baseline.  Encourage fluids.   2. Stage 3 chronic kidney disease, unspecified whether stage 3a or 3b CKD  (H)  N18.30  Creatinine at baseline.  Continue to monitor.   3. Hypertension, unspecified type  I10  satisfactory control.   4. Excoriation of buttock, initial encounter  S30.810A  Apply Calmoseptine every shift.         This note has been dictated using voice recognition software. Any grammatical or context distortions are unintentional and inherent to the software    Electronically signed by: Farzana Garcia CNP

## 2022-08-11 ENCOUNTER — TRANSITIONAL CARE UNIT VISIT (OUTPATIENT)
Dept: GERIATRICS | Facility: CLINIC | Age: 87
End: 2022-08-11
Payer: COMMERCIAL

## 2022-08-11 VITALS
OXYGEN SATURATION: 98 % | HEART RATE: 83 BPM | HEIGHT: 66 IN | RESPIRATION RATE: 16 BRPM | TEMPERATURE: 98.2 F | DIASTOLIC BLOOD PRESSURE: 67 MMHG | SYSTOLIC BLOOD PRESSURE: 117 MMHG | BODY MASS INDEX: 28.22 KG/M2 | WEIGHT: 175.6 LBS

## 2022-08-11 DIAGNOSIS — N30.00 ACUTE CYSTITIS WITHOUT HEMATURIA: Primary | ICD-10-CM

## 2022-08-11 DIAGNOSIS — I10 HYPERTENSION, UNSPECIFIED TYPE: ICD-10-CM

## 2022-08-11 DIAGNOSIS — S30.810A EXCORIATION OF BUTTOCK, INITIAL ENCOUNTER: ICD-10-CM

## 2022-08-11 DIAGNOSIS — N18.30 STAGE 3 CHRONIC KIDNEY DISEASE, UNSPECIFIED WHETHER STAGE 3A OR 3B CKD (H): ICD-10-CM

## 2022-08-11 DIAGNOSIS — G31.84 MILD COGNITIVE IMPAIRMENT: ICD-10-CM

## 2022-08-11 PROCEDURE — 99309 SBSQ NF CARE MODERATE MDM 30: CPT | Performed by: NURSE PRACTITIONER

## 2022-08-11 NOTE — PROGRESS NOTES
[facility] GERIATRIC SERVICES    Code Status:  FULL CODE   Visit Type:   Chief Complaint   Patient presents with     Nursing Home Acute     TCU Follow up     Facility:  [facility] ([facility]) [[id number]]           Transitional Care Course: Rae Yates is a 89 year old female who I am seeing today for follow-up on the TCU.  Patient recently hospitalized on 7/28/2022.  Patient hospitalized at [facility] secondary to UTI.  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  Per the records patient tripped and put her arms out to stop her self and fell forward hitting her head and landing on the left side of her body.  Patient reported crying for help for several hours.  She had had increasing weakness over the last few weeks prior to her fall including poor oral intake and urinary incontinence.  She does have some urinary incontinence at baseline.  She reported her edema was increased in her left leg.    Today patient sitting up in wheelchair.  She does have some mild cognitive impairment.  She appears to answer questions appropriately.  Recent UTI.  She is completed her oral antibiotics.  No dysuria frequency burning or pain.  Patient with excoriation to bilateral buttocks.  She is receiving topical treatment.  Stage III chronic kidney disease.  Creatinine stable.  Hypertension.  Blood pressure satisfactory.  She continues on Tylenol for pain.  She was having some increased pain in that left shoulder.  She is receiving pain modalities with improvement.  She was able to raise her left arm overhead.  Patient reports she is eating well and having regular bowel movements.    Active Ambulatory Problems     Diagnosis Date Noted     Prolapsed urethral mucosa 04/13/2021     Postmenopausal atrophic vaginitis 04/13/2021     Joint Pain, Localized In  "The Hip      Hypertension      Diverticulosis      Diverticulitis 09/21/2017     Leukocytosis, unspecified type 09/21/2017     Hyponatremia 09/21/2017     SEGUNDO (acute kidney injury) (H) 09/21/2017     Multiple allergies      CKD (chronic kidney disease) stage 3, GFR 30-59 ml/min (H) 11/09/2018     Chronic pain 09/13/2011     Cerebral infarction (H) 08/03/2022     Plantar fasciitis 04/09/2012     B12 deficiency 02/23/2022     Anxiety 08/03/2022     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     Allergic rhinitis 08/03/2022     Actinic keratosis 08/03/2022     AAA (abdominal aortic aneurysm) (H) 08/03/2022     DJD (degenerative joint disease) of knee 09/19/2012     DJD (degenerative joint disease), lumbar 08/03/2022     Edema of left lower leg due to peripheral venous insufficiency 07/25/2018     Emphysema of lung (H) 02/06/2015     Female bladder prolapse, acquired 08/03/2022     FHx: AAA 11/01/1995     Macular degeneration 08/03/2022     Mild cognitive impairment with memory loss 04/22/2022     Obesity 08/03/2022     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     Prediabetes 08/03/2022     Pulmonary nodule 02/02/2015     Pure hypercholesterolemia 08/03/2022     Right internal carotid artery aneurysm 10/12/2015     Sciatica of left side 12/04/2013     Sialadenitis 08/03/2022     TIA (transient ischemic attack) 08/03/2022     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    Macrobid  Macrobid       Prednisone Other (See Comments)     Colon bleeding     Sulfamethoxazole-Trimethoprim Dizziness and Shortness Of Breath     " "Sulfamethoxazole-Trimethoprim [Sulfamethoxazole W/Trimethoprim] Dizziness and Shortness Of Breath     Cephalexin Nausea     NAUSEA HEADACHE, Keflex  NAUSEA HEADACHE  Keflex  NAUSEA HEADACHE  Keflex  NAUSEA HEADACHE  Keflex       Clindamycin Headache     Bloat  HA  heartburn  Bloat  HA  heartburn  Bloat  HA  heartburn     Clopidogrel Unknown and Diarrhea     PLAVIX  EPISTAAXIS  PLAVIX  EPISTAAXIS    PLAVIX  EPISTAAXIS       Clopidogrel Bisulfate [Clopidogrel] Diarrhea     PLAVIX, EPISTAAXIS     Cortisone Other (See Comments)     Other reaction(s): Flushing     Escitalopram Unknown     Other reaction(s): *Unknown     Levofloxacin Nausea     Weak, nausea, diarrhea  Weak, nausea, diarrhea  Weak, nausea, diarrhea     Metronidazole Unknown     Nitrofurantoin Monohyd/M-Cryst [Nitrofurantoin] Unknown     \"Spacey\" \"Funny\" sensation throughout the body/did not tolerate; No Rash.       Nitroimidazoles [Metronidazole] Nausea     FLAGYL, Weak, nausea, diarrhea     Nsaids (Non-Steroidal Anti-Inflammatory Drug) [Nsaids] Dizziness and Nausea     several     Olmesartan Dizziness     BENICAR  BENICAR    BENICAR     Pravastatin Headache and Nausea     Simvastatin Nausea     Weak  Weak    Weak     Rifampin Nausea and Vomiting     With fatigue, loss of appetite       All Meds and Allergies reviewed in the record at the facility and is the most up-to-date.    Current Outpatient Medications   Medication Sig     acetaminophen (TYLENOL) 500 MG tablet Take 500 mg by mouth every 4 hours as needed  mg; amt: 500mg PO HS; oral  Special Instructions: Max dose is 4000mg in 24     cyanocobalamin (VITAMIN B-12) 500 MCG tablet Take 500 mcg by mouth daily     furosemide (LASIX) 40 MG tablet Take 40 mg by mouth daily     gabapentin (NEURONTIN) 300 MG capsule Take 300 mg by mouth 2 times daily     hydrocortisone (CORTAID) 1 % external cream Apply topically 2 times daily     lisinopril (PRINIVIL,ZESTRIL) 10 MG tablet [LISINOPRIL (PRINIVIL,ZESTRIL) " "10 MG TABLET] Take 1 tablet (10 mg total) by mouth 2 (two) times a day. (Patient taking differently: Take 40 mg by mouth daily)     Menthol-Methyl Salicylate (ICY HOT EXTRA STRENGTH) 10-30 % CREA Externally apply topically 3 times daily as needed     mirtazapine (REMERON) 30 MG tablet Take 30 mg by mouth At Bedtime     Multiple Vitamins-Minerals (PRESERVISION AREDS 2) CAPS Take 1 capsule by mouth 2 times daily preserVision AREDS-2 (vit c,w-kr-pgqfq-lutein-zeaxan 250-90-40-1     polyvinyl alcohol-povidone PF (REFRESH) 1.4-0.6 % ophthalmic solution Place 1-2 drops into both eyes every 4 hours as needed for irritation     potassium chloride (KLOR-CON) 20 MEQ packet Take 20 mEq by mouth daily     No current facility-administered medications for this visit.       REVIEW OF SYSTEMS:   Review of Systems  7 point review of systems reviewed.  Pertinent positives in the HPI.    PHYSICAL EXAMINATION:  Physical Exam     Vital signs: /67   Pulse 83   Temp 98.2  F (36.8  C)   Resp 16   Ht 1.676 m (5' 6\")   Wt 79.7 kg (175 lb 9.6 oz)   SpO2 98%   BMI 28.34 kg/m    General: Awake, Alert,  conversant  HEENT:Pink conjunctiva, anicteric sclerae, moist oral mucosa.  Bruising under her chin improving.  NECK: Supple  BACK: No kyphosis of the thoracic spine  ABDOMEN: Soft,  with positive bowel sounds  EXTREMITIES: Moves both upper and lower extremities.  She is able to raise her left upper extremity overhead.  1+ pedal edema greater on the left, no calf tenderness  SKIN: Bruising improved.    NEUROLOGIC: Intact, pulses palpable  PSYCHIATRIC: Cognitive impairment noted.      Labs:  All labs reviewed in the nursing home record and Epic   @  Lab Results   Component Value Date    WBC 7.8 08/04/2022     Lab Results   Component Value Date    RBC 3.48 08/04/2022     Lab Results   Component Value Date    HGB 10.7 08/04/2022     Lab Results   Component Value Date    HCT 33.4 08/04/2022     Lab Results   Component Value Date    MCV 96 " 08/04/2022     Lab Results   Component Value Date    MCH 30.7 08/04/2022     Lab Results   Component Value Date    MCHC 32.0 08/04/2022     Lab Results   Component Value Date    RDW 13.9 08/04/2022     Lab Results   Component Value Date     08/04/2022        @Last Comprehensive Metabolic Panel:  Sodium   Date Value Ref Range Status   08/04/2022 135 (L) 136 - 145 mmol/L Final     Potassium   Date Value Ref Range Status   08/04/2022 5.1 3.4 - 5.3 mmol/L Final   10/22/2019 4.4 3.5 - 5.0 mmol/L Final     Chloride   Date Value Ref Range Status   08/04/2022 97 (L) 98 - 107 mmol/L Final   10/22/2019 96 (L) 98 - 107 mmol/L Final     Carbon Dioxide (CO2)   Date Value Ref Range Status   08/04/2022 27 22 - 29 mmol/L Final   10/22/2019 25 22 - 31 mmol/L Final     Anion Gap   Date Value Ref Range Status   08/04/2022 11 7 - 15 mmol/L Final   10/22/2019 12 5 - 18 mmol/L Final     Glucose   Date Value Ref Range Status   08/04/2022 97 70 - 99 mg/dL Final   10/22/2019 115 70 - 125 mg/dL Final     Urea Nitrogen   Date Value Ref Range Status   08/04/2022 37.7 (H) 8.0 - 23.0 mg/dL Final   10/22/2019 14 8 - 28 mg/dL Final     Creatinine   Date Value Ref Range Status   08/04/2022 1.41 (H) 0.51 - 0.95 mg/dL Final     GFR Estimate   Date Value Ref Range Status   08/04/2022 35 (L) >60 mL/min/1.73m2 Final     Comment:     Effective December 21, 2021 eGFRcr in adults is calculated using the 2021 CKD-EPI creatinine equation which includes age and gender (Eren et al., NEJM, DOI: 10.1056/QMKScy3589841)   10/22/2019 49 (L) >60 mL/min/1.73m2 Final     Calcium   Date Value Ref Range Status   08/04/2022 9.7 (H) 8.2 - 9.6 mg/dL Final     Assessment/plan:    ICD-10-CM    1. Acute cystitis without hematuria  N30.00  oral antibiotics complete.  She is voiding adequately.   2. Stage 3 chronic kidney disease, unspecified whether stage 3a or 3b CKD (H)  N18.30  Creatinine stable.   3. Hypertension, unspecified type  I10  suspect controlled.   4.  Excoriation of buttock, initial encounter  S30.810A  Continue topical treatment.   5. Mild cognitive impairment  G31.84  Stable.         This note has been dictated using voice recognition software. Any grammatical or context distortions are unintentional and inherent to the software    Electronically signed by: Farzana Garcia CNP

## 2022-08-11 NOTE — LETTER
8/11/2022        RE: Rae Yates  [address] [place] [address]        [facility] GERIATRIC SERVICES    Code Status:  FULL CODE   Visit Type:   Chief Complaint   Patient presents with     Nursing Home Acute     TCU Follow up     Facility:  [facility] ([facility]) [[id number]]           Transitional Care Course: Rae Yates is a 89 year old female who I am seeing today for follow-up on the TCU.  Patient recently hospitalized on 7/28/2022.  Patient hospitalized at [facility] secondary to UTI.  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  Per the records patient tripped and put her arms out to stop her self and fell forward hitting her head and landing on the left side of her body.  Patient reported crying for help for several hours.  She had had increasing weakness over the last few weeks prior to her fall including poor oral intake and urinary incontinence.  She does have some urinary incontinence at baseline.  She reported her edema was increased in her left leg.    Today patient sitting up in wheelchair.  She does have some mild cognitive impairment.  She appears to answer questions appropriately.  Recent UTI.  She is completed her oral antibiotics.  No dysuria frequency burning or pain.  Patient with excoriation to bilateral buttocks.  She is receiving topical treatment.  Stage III chronic kidney disease.  Creatinine stable.  Hypertension.  Blood pressure satisfactory.  She continues on Tylenol for pain.  She was having some increased pain in that left shoulder.  She is receiving pain modalities with improvement.  She was able to raise her left arm overhead.  Patient reports she is eating well and having regular bowel movements.    Active Ambulatory Problems     Diagnosis Date Noted     Prolapsed urethral mucosa  "04/13/2021     Postmenopausal atrophic vaginitis 04/13/2021     Joint Pain, Localized In The Hip      Hypertension      Diverticulosis      Diverticulitis 09/21/2017     Leukocytosis, unspecified type 09/21/2017     Hyponatremia 09/21/2017     SEGUNDO (acute kidney injury) (H) 09/21/2017     Multiple allergies      CKD (chronic kidney disease) stage 3, GFR 30-59 ml/min (H) 11/09/2018     Chronic pain 09/13/2011     Cerebral infarction (H) 08/03/2022     Plantar fasciitis 04/09/2012     B12 deficiency 02/23/2022     Anxiety 08/03/2022     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     Allergic rhinitis 08/03/2022     Actinic keratosis 08/03/2022     AAA (abdominal aortic aneurysm) (H) 08/03/2022     DJD (degenerative joint disease) of knee 09/19/2012     DJD (degenerative joint disease), lumbar 08/03/2022     Edema of left lower leg due to peripheral venous insufficiency 07/25/2018     Emphysema of lung (H) 02/06/2015     Female bladder prolapse, acquired 08/03/2022     FHx: AAA 11/01/1995     Macular degeneration 08/03/2022     Mild cognitive impairment with memory loss 04/22/2022     Obesity 08/03/2022     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     Prediabetes 08/03/2022     Pulmonary nodule 02/02/2015     Pure hypercholesterolemia 08/03/2022     Right internal carotid artery aneurysm 10/12/2015     Sciatica of left side 12/04/2013     Sialadenitis 08/03/2022     TIA (transient ischemic attack) 08/03/2022     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    Macrobid  Macrobid       Prednisone Other (See Comments)     Colon " "bleeding     Sulfamethoxazole-Trimethoprim Dizziness and Shortness Of Breath     Sulfamethoxazole-Trimethoprim [Sulfamethoxazole W/Trimethoprim] Dizziness and Shortness Of Breath     Cephalexin Nausea     NAUSEA HEADACHE, Keflex  NAUSEA HEADACHE  Keflex  NAUSEA HEADACHE  Keflex  NAUSEA HEADACHE  Keflex       Clindamycin Headache     Bloat  HA  heartburn  Bloat  HA  heartburn  Bloat  HA  heartburn     Clopidogrel Unknown and Diarrhea     PLAVIX  EPISTAAXIS  PLAVIX  EPISTAAXIS    PLAVIX  EPISTAAXIS       Clopidogrel Bisulfate [Clopidogrel] Diarrhea     PLAVIX, EPISTAAXIS     Cortisone Other (See Comments)     Other reaction(s): Flushing     Escitalopram Unknown     Other reaction(s): *Unknown     Levofloxacin Nausea     Weak, nausea, diarrhea  Weak, nausea, diarrhea  Weak, nausea, diarrhea     Metronidazole Unknown     Nitrofurantoin Monohyd/M-Cryst [Nitrofurantoin] Unknown     \"Spacey\" \"Funny\" sensation throughout the body/did not tolerate; No Rash.       Nitroimidazoles [Metronidazole] Nausea     FLAGYL, Weak, nausea, diarrhea     Nsaids (Non-Steroidal Anti-Inflammatory Drug) [Nsaids] Dizziness and Nausea     several     Olmesartan Dizziness     BENICAR  BENICAR    BENICAR     Pravastatin Headache and Nausea     Simvastatin Nausea     Weak  Weak    Weak     Rifampin Nausea and Vomiting     With fatigue, loss of appetite       All Meds and Allergies reviewed in the record at the facility and is the most up-to-date.    Current Outpatient Medications   Medication Sig     acetaminophen (TYLENOL) 500 MG tablet Take 500 mg by mouth every 4 hours as needed  mg; amt: 500mg PO HS; oral  Special Instructions: Max dose is 4000mg in 24     cyanocobalamin (VITAMIN B-12) 500 MCG tablet Take 500 mcg by mouth daily     furosemide (LASIX) 40 MG tablet Take 40 mg by mouth daily     gabapentin (NEURONTIN) 300 MG capsule Take 300 mg by mouth 2 times daily     hydrocortisone (CORTAID) 1 % external cream Apply topically 2 times " "daily     lisinopril (PRINIVIL,ZESTRIL) 10 MG tablet [LISINOPRIL (PRINIVIL,ZESTRIL) 10 MG TABLET] Take 1 tablet (10 mg total) by mouth 2 (two) times a day. (Patient taking differently: Take 40 mg by mouth daily)     Menthol-Methyl Salicylate (ICY HOT EXTRA STRENGTH) 10-30 % CREA Externally apply topically 3 times daily as needed     mirtazapine (REMERON) 30 MG tablet Take 30 mg by mouth At Bedtime     Multiple Vitamins-Minerals (PRESERVISION AREDS 2) CAPS Take 1 capsule by mouth 2 times daily preserVision AREDS-2 (vit c,w-ka-srhjr-lutein-zeaxan 250-90-40-1     polyvinyl alcohol-povidone PF (REFRESH) 1.4-0.6 % ophthalmic solution Place 1-2 drops into both eyes every 4 hours as needed for irritation     potassium chloride (KLOR-CON) 20 MEQ packet Take 20 mEq by mouth daily     No current facility-administered medications for this visit.       REVIEW OF SYSTEMS:   Review of Systems  7 point review of systems reviewed.  Pertinent positives in the HPI.    PHYSICAL EXAMINATION:  Physical Exam     Vital signs: /67   Pulse 83   Temp 98.2  F (36.8  C)   Resp 16   Ht 1.676 m (5' 6\")   Wt 79.7 kg (175 lb 9.6 oz)   SpO2 98%   BMI 28.34 kg/m    General: Awake, Alert,  conversant  HEENT:Pink conjunctiva, anicteric sclerae, moist oral mucosa.  Bruising under her chin improving.  NECK: Supple  BACK: No kyphosis of the thoracic spine  ABDOMEN: Soft,  with positive bowel sounds  EXTREMITIES: Moves both upper and lower extremities.  She is able to raise her left upper extremity overhead.  1+ pedal edema greater on the left, no calf tenderness  SKIN: Bruising improved.    NEUROLOGIC: Intact, pulses palpable  PSYCHIATRIC: Cognitive impairment noted.      Labs:  All labs reviewed in the nursing home record and Epic   @  Lab Results   Component Value Date    WBC 7.8 08/04/2022     Lab Results   Component Value Date    RBC 3.48 08/04/2022     Lab Results   Component Value Date    HGB 10.7 08/04/2022     Lab Results   Component " Value Date    HCT 33.4 08/04/2022     Lab Results   Component Value Date    MCV 96 08/04/2022     Lab Results   Component Value Date    MCH 30.7 08/04/2022     Lab Results   Component Value Date    MCHC 32.0 08/04/2022     Lab Results   Component Value Date    RDW 13.9 08/04/2022     Lab Results   Component Value Date     08/04/2022        @Last Comprehensive Metabolic Panel:  Sodium   Date Value Ref Range Status   08/04/2022 135 (L) 136 - 145 mmol/L Final     Potassium   Date Value Ref Range Status   08/04/2022 5.1 3.4 - 5.3 mmol/L Final   10/22/2019 4.4 3.5 - 5.0 mmol/L Final     Chloride   Date Value Ref Range Status   08/04/2022 97 (L) 98 - 107 mmol/L Final   10/22/2019 96 (L) 98 - 107 mmol/L Final     Carbon Dioxide (CO2)   Date Value Ref Range Status   08/04/2022 27 22 - 29 mmol/L Final   10/22/2019 25 22 - 31 mmol/L Final     Anion Gap   Date Value Ref Range Status   08/04/2022 11 7 - 15 mmol/L Final   10/22/2019 12 5 - 18 mmol/L Final     Glucose   Date Value Ref Range Status   08/04/2022 97 70 - 99 mg/dL Final   10/22/2019 115 70 - 125 mg/dL Final     Urea Nitrogen   Date Value Ref Range Status   08/04/2022 37.7 (H) 8.0 - 23.0 mg/dL Final   10/22/2019 14 8 - 28 mg/dL Final     Creatinine   Date Value Ref Range Status   08/04/2022 1.41 (H) 0.51 - 0.95 mg/dL Final     GFR Estimate   Date Value Ref Range Status   08/04/2022 35 (L) >60 mL/min/1.73m2 Final     Comment:     Effective December 21, 2021 eGFRcr in adults is calculated using the 2021 CKD-EPI creatinine equation which includes age and gender (Eren wagoner al., NEJM, DOI: 10.1056/[id number])   10/22/2019 49 (L) >60 mL/min/1.73m2 Final     Calcium   Date Value Ref Range Status   08/04/2022 9.7 (H) 8.2 - 9.6 mg/dL Final     Assessment/plan:    ICD-10-CM    1. Acute cystitis without hematuria  N30.00  oral antibiotics complete.  She is voiding adequately.   2. Stage 3 chronic kidney disease, unspecified whether stage 3a or 3b CKD (H)  N18.30   Creatinine stable.   3. Hypertension, unspecified type  I10  suspect controlled.   4. Excoriation of buttock, initial encounter  S30.810A  Continue topical treatment.   5. Mild cognitive impairment  G31.84  Stable.         This note has been dictated using voice recognition software. Any grammatical or context distortions are unintentional and inherent to the software    Electronically signed by: Farzana Garcia CNP           Sincerely,        Farzana Garcia, NP

## 2022-08-12 ENCOUNTER — LAB REQUISITION (OUTPATIENT)
Dept: LAB | Facility: CLINIC | Age: 87
End: 2022-08-12
Payer: COMMERCIAL

## 2022-08-12 DIAGNOSIS — E87.1 HYPO-OSMOLALITY AND HYPONATREMIA: ICD-10-CM

## 2022-08-12 DIAGNOSIS — N39.0 URINARY TRACT INFECTION, SITE NOT SPECIFIED: ICD-10-CM

## 2022-08-12 LAB
ALBUMIN UR-MCNC: NEGATIVE MG/DL
ANION GAP SERPL CALCULATED.3IONS-SCNC: 20 MMOL/L (ref 7–15)
APPEARANCE UR: ABNORMAL
BACTERIA #/AREA URNS HPF: ABNORMAL /HPF
BILIRUB UR QL STRIP: NEGATIVE
BUN SERPL-MCNC: 31.9 MG/DL (ref 8–23)
CALCIUM SERPL-MCNC: 9.6 MG/DL (ref 8.2–9.6)
CHLORIDE SERPL-SCNC: 98 MMOL/L (ref 98–107)
COLOR UR AUTO: ABNORMAL
CREAT SERPL-MCNC: 1.42 MG/DL (ref 0.51–0.95)
DEPRECATED HCO3 PLAS-SCNC: 19 MMOL/L (ref 22–29)
ERYTHROCYTE [DISTWIDTH] IN BLOOD BY AUTOMATED COUNT: 13.7 % (ref 10–15)
GFR SERPL CREATININE-BSD FRML MDRD: 35 ML/MIN/1.73M2
GLUCOSE SERPL-MCNC: 111 MG/DL (ref 70–99)
GLUCOSE UR STRIP-MCNC: NEGATIVE MG/DL
HCT VFR BLD AUTO: 35.3 % (ref 35–47)
HGB BLD-MCNC: 11.1 G/DL (ref 11.7–15.7)
HGB UR QL STRIP: NEGATIVE
KETONES UR STRIP-MCNC: NEGATIVE MG/DL
LEUKOCYTE ESTERASE UR QL STRIP: ABNORMAL
MCH RBC QN AUTO: 30.7 PG (ref 26.5–33)
MCHC RBC AUTO-ENTMCNC: 31.4 G/DL (ref 31.5–36.5)
MCV RBC AUTO: 98 FL (ref 78–100)
MUCOUS THREADS #/AREA URNS LPF: PRESENT /LPF
NITRATE UR QL: NEGATIVE
PH UR STRIP: 5 [PH] (ref 5–7)
PLATELET # BLD AUTO: 333 10E3/UL (ref 150–450)
POTASSIUM SERPL-SCNC: 5 MMOL/L (ref 3.4–5.3)
RBC # BLD AUTO: 3.61 10E6/UL (ref 3.8–5.2)
RBC URINE: 2 /HPF
SODIUM SERPL-SCNC: 137 MMOL/L (ref 136–145)
SP GR UR STRIP: 1.02 (ref 1–1.03)
SQUAMOUS EPITHELIAL: 1 /HPF
TRANSITIONAL EPI: <1 /HPF
UROBILINOGEN UR STRIP-MCNC: NORMAL MG/DL
WBC # BLD AUTO: 8.3 10E3/UL (ref 4–11)
WBC CLUMPS #/AREA URNS HPF: PRESENT /HPF
WBC URINE: 146 /HPF

## 2022-08-12 PROCEDURE — 80048 BASIC METABOLIC PNL TOTAL CA: CPT | Mod: ORL | Performed by: FAMILY MEDICINE

## 2022-08-12 PROCEDURE — 85027 COMPLETE CBC AUTOMATED: CPT | Mod: ORL | Performed by: FAMILY MEDICINE

## 2022-08-12 PROCEDURE — 81001 URINALYSIS AUTO W/SCOPE: CPT | Mod: ORL | Performed by: NURSE PRACTITIONER

## 2022-08-12 PROCEDURE — 87088 URINE BACTERIA CULTURE: CPT | Mod: ORL

## 2022-08-12 PROCEDURE — 87088 URINE BACTERIA CULTURE: CPT | Mod: ORL | Performed by: NURSE PRACTITIONER

## 2022-08-12 PROCEDURE — 81001 URINALYSIS AUTO W/SCOPE: CPT | Mod: ORL

## 2022-08-14 VITALS
TEMPERATURE: 97 F | DIASTOLIC BLOOD PRESSURE: 65 MMHG | OXYGEN SATURATION: 95 % | HEART RATE: 103 BPM | BODY MASS INDEX: 28.22 KG/M2 | WEIGHT: 175.6 LBS | RESPIRATION RATE: 16 BRPM | HEIGHT: 66 IN | SYSTOLIC BLOOD PRESSURE: 119 MMHG

## 2022-08-15 ENCOUNTER — TRANSITIONAL CARE UNIT VISIT (OUTPATIENT)
Dept: GERIATRICS | Facility: CLINIC | Age: 87
End: 2022-08-15
Payer: COMMERCIAL

## 2022-08-15 DIAGNOSIS — N18.30 STAGE 3 CHRONIC KIDNEY DISEASE, UNSPECIFIED WHETHER STAGE 3A OR 3B CKD (H): ICD-10-CM

## 2022-08-15 DIAGNOSIS — I10 HYPERTENSION, UNSPECIFIED TYPE: ICD-10-CM

## 2022-08-15 DIAGNOSIS — G31.84 MILD COGNITIVE IMPAIRMENT: ICD-10-CM

## 2022-08-15 DIAGNOSIS — Z86.73 HISTORY OF TIA (TRANSIENT ISCHEMIC ATTACK) AND STROKE: ICD-10-CM

## 2022-08-15 DIAGNOSIS — N30.00 ACUTE CYSTITIS WITHOUT HEMATURIA: Primary | ICD-10-CM

## 2022-08-15 LAB — BACTERIA UR CULT: ABNORMAL

## 2022-08-15 PROCEDURE — 99310 SBSQ NF CARE HIGH MDM 45: CPT | Performed by: NURSE PRACTITIONER

## 2022-08-15 NOTE — LETTER
8/15/2022        RE: Rae Yates  [address] [place] [address]        [facility]    Code Status:  FULL CODE   Visit Type:   Chief Complaint   Patient presents with     Nursing Home Acute     TCU Follow up     Facility:  [facility] ([facility]) [[id number]]           Transitional Care Course: Rae Yates is a 89 year old female who I am seeing today for follow-up on the TCU.  Patient recently hospitalized on 7/28/2022.  Patient hospitalized at [facility] secondary to UTI.  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  Per the records patient tripped and put her arms out to stop her self and fell forward hitting her head and landing on the left side of her body.  Patient reported crying for help for several hours.  She had had increasing weakness over the last few weeks prior to her fall including poor oral intake and urinary incontinence.  She does have some urinary incontinence at baseline.  She reported her edema was increased in her left leg.    Today patient sitting up in wheelchair.  Patient with underlying cognitive impairment and is somewhat of a poor historian.  Over the weekend patient had an episode in which she became very lethargic.  She had a nonresponsive episode however did respond to sternal rub.  Upon arousal she had stuttering speech.  Patient with a history of recurrent TIAs.  Symptoms appear most consistent with possible TIA.  She did undergo laboratory.  CBC unremarkable.  She admitted with UTI.  She had completed her oral antibiotics.  Repeat UA appeared positive.  Today UC grew greater than 100,000 Enterobacter cloacae complex.  Patient currently afebrile.  Chest x-ray also obtain which was negative. Stage III chronic kidney disease.  Creatinine elevated at 1.42.  Fluids are  being encouraged.  Today she has returned to her baseline.  She is moving all extremities without difficulty.  Speech is clear.  Blood pressure satisfactory.  She continues on Tylenol for pain.  She does have breakdown to her bottom in which she is receiving topical treatment.  She was having some left shoulder pain however this is improving.  10    Active Ambulatory Problems     Diagnosis Date Noted     Prolapsed urethral mucosa 04/13/2021     Postmenopausal atrophic vaginitis 04/13/2021     Joint Pain, Localized In The Hip      Hypertension      Diverticulosis      Diverticulitis 09/21/2017     Leukocytosis, unspecified type 09/21/2017     Hyponatremia 09/21/2017     SEGUNDO (acute kidney injury) (H) 09/21/2017     Multiple allergies      CKD (chronic kidney disease) stage 3, GFR 30-59 ml/min (H) 11/09/2018     Chronic pain 09/13/2011     Cerebral infarction (H) 08/03/2022     Plantar fasciitis 04/09/2012     B12 deficiency 02/23/2022     Anxiety 08/03/2022     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     Allergic rhinitis 08/03/2022     Actinic keratosis 08/03/2022     AAA (abdominal aortic aneurysm) (H) 08/03/2022     DJD (degenerative joint disease) of knee 09/19/2012     DJD (degenerative joint disease), lumbar 08/03/2022     Edema of left lower leg due to peripheral venous insufficiency 07/25/2018     Emphysema of lung (H) 02/06/2015     Female bladder prolapse, acquired 08/03/2022     FHx: AAA 11/01/1995     Macular degeneration 08/03/2022     Mild cognitive impairment with memory loss 04/22/2022     Obesity 08/03/2022     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     Prediabetes 08/03/2022     Pulmonary nodule 02/02/2015     Pure hypercholesterolemia 08/03/2022     Right internal carotid artery aneurysm 10/12/2015     Sciatica of left side 12/04/2013     Sialadenitis 08/03/2022     TIA (transient ischemic attack) 08/03/2022     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    Macrobid  Macrobid       Prednisone Other (See Comments)     Colon bleeding     Sulfamethoxazole-Trimethoprim Dizziness and Shortness Of Breath     Sulfamethoxazole-Trimethoprim [Sulfamethoxazole W/Trimethoprim] Dizziness and Shortness Of Breath     Cephalexin Nausea     NAUSEA HEADACHE, Keflex  NAUSEA HEADACHE  Keflex  NAUSEA HEADACHE  Keflex  NAUSEA HEADACHE  Keflex       Clindamycin Headache     Bloat  HA  heartburn  Bloat  HA  heartburn  Bloat  HA  heartburn     Clopidogrel Unknown and Diarrhea     PLAVIX  EPISTAAXIS  PLAVIX  EPISTAAXIS    PLAVIX  EPISTAAXIS       Clopidogrel Bisulfate [Clopidogrel] Diarrhea     PLAVIX, EPISTAAXIS     Cortisone Other (See Comments)     Other reaction(s): Flushing     Escitalopram Unknown     Other reaction(s): *Unknown     Levofloxacin Nausea     Weak, nausea, diarrhea  Weak, nausea, diarrhea  Weak, nausea, diarrhea     Metronidazole Unknown     Nitrofurantoin Monohyd/M-Cryst [Nitrofurantoin] Unknown     \"Spacey\" \"Funny\" sensation throughout the body/did not tolerate; No Rash.       Nitroimidazoles [Metronidazole] Nausea     FLAGYL, Weak, nausea, diarrhea     Nsaids (Non-Steroidal Anti-Inflammatory Drug) [Nsaids] Dizziness and Nausea     several     Olmesartan Dizziness     BENICAR  BENICAR    BENICAR     Pravastatin Headache and Nausea     Simvastatin Nausea     Weak  Weak    Weak     Rifampin Nausea and Vomiting     With fatigue, loss of appetite       All Meds and Allergies reviewed in the record at the facility and is the most up-to-date.    Current Outpatient Medications   Medication Sig     acetaminophen " "(TYLENOL) 500 MG tablet Take 500 mg by mouth every 4 hours as needed  mg; amt: 500mg PO HS; oral  Special Instructions: Max dose is 4000mg in 24     cyanocobalamin (VITAMIN B-12) 500 MCG tablet Take 500 mcg by mouth daily     furosemide (LASIX) 40 MG tablet Take 40 mg by mouth daily     gabapentin (NEURONTIN) 300 MG capsule Take 300 mg by mouth 2 times daily     hydrocortisone (CORTAID) 1 % external cream Apply topically 2 times daily     lisinopril (PRINIVIL,ZESTRIL) 10 MG tablet [LISINOPRIL (PRINIVIL,ZESTRIL) 10 MG TABLET] Take 1 tablet (10 mg total) by mouth 2 (two) times a day. (Patient taking differently: Take 40 mg by mouth daily)     Menthol-Methyl Salicylate (ICY HOT EXTRA STRENGTH) 10-30 % CREA Externally apply topically 3 times daily as needed     mirtazapine (REMERON) 30 MG tablet Take 30 mg by mouth At Bedtime     Multiple Vitamins-Minerals (PRESERVISION AREDS 2) CAPS Take 1 capsule by mouth 2 times daily preserVision AREDS-2 (vit c,e-lf-wnrlw-lutein-zeaxan 250-90-40-1     polyvinyl alcohol-povidone PF (REFRESH) 1.4-0.6 % ophthalmic solution Place 1-2 drops into both eyes every 4 hours as needed for irritation     potassium chloride (KLOR-CON) 20 MEQ packet Take 20 mEq by mouth daily     No current facility-administered medications for this visit.       REVIEW OF SYSTEMS:   Review of Systems  10 point review of systems reviewed.  Pertinent positives in the HPI.    PHYSICAL EXAMINATION:  Physical Exam     Vital signs: /65   Pulse 103   Temp 97  F (36.1  C)   Resp 16   Ht 1.676 m (5' 6\")   Wt 79.7 kg (175 lb 9.6 oz)   SpO2 95%   BMI 28.34 kg/m    General: Awake, Alert,  conversant  HEENT:Pink conjunctiva, anicteric sclerae, moist oral mucosa.   NECK: Supple  CARDIOVASCULAR: S1-S2 without murmur gallop.  RESPIRATORY: No wheezes rales or rhonchi.  BACK: No kyphosis of the thoracic spine  ABDOMEN: Soft,  with positive bowel sounds  EXTREMITIES: Moves both upper and lower extremities.  She " is able to raise her left upper extremity overhead.  Trace lower extremity edema.  SKIN: Bruising improved to chin and left side.  She does have excoriation to her bottom and has topical treatment in place.  NEUROLOGIC: Intact, pulses palpable  PSYCHIATRIC: Cognitive impairment noted.  Poor recall of events.      Labs:  All labs reviewed in the nursing home record and Epic   @  Lab Results   Component Value Date    WBC 7.8 08/04/2022     Lab Results   Component Value Date    RBC 3.48 08/04/2022     Lab Results   Component Value Date    HGB 10.7 08/04/2022     Lab Results   Component Value Date    HCT 33.4 08/04/2022     Lab Results   Component Value Date    MCV 96 08/04/2022     Lab Results   Component Value Date    MCH 30.7 08/04/2022     Lab Results   Component Value Date    MCHC 32.0 08/04/2022     Lab Results   Component Value Date    RDW 13.9 08/04/2022     Lab Results   Component Value Date     08/04/2022        @Last Comprehensive Metabolic Panel:  Sodium   Date Value Ref Range Status   08/12/2022 137 136 - 145 mmol/L Final     Potassium   Date Value Ref Range Status   08/12/2022 5.0 3.4 - 5.3 mmol/L Final   10/22/2019 4.4 3.5 - 5.0 mmol/L Final     Chloride   Date Value Ref Range Status   08/12/2022 98 98 - 107 mmol/L Final   10/22/2019 96 (L) 98 - 107 mmol/L Final     Carbon Dioxide (CO2)   Date Value Ref Range Status   08/12/2022 19 (L) 22 - 29 mmol/L Final   10/22/2019 25 22 - 31 mmol/L Final     Anion Gap   Date Value Ref Range Status   08/12/2022 20 (H) 7 - 15 mmol/L Final   10/22/2019 12 5 - 18 mmol/L Final     Glucose   Date Value Ref Range Status   08/12/2022 111 (H) 70 - 99 mg/dL Final   10/22/2019 115 70 - 125 mg/dL Final     Urea Nitrogen   Date Value Ref Range Status   08/12/2022 31.9 (H) 8.0 - 23.0 mg/dL Final   10/22/2019 14 8 - 28 mg/dL Final     Creatinine   Date Value Ref Range Status   08/12/2022 1.42 (H) 0.51 - 0.95 mg/dL Final     GFR Estimate   Date Value Ref Range Status    08/12/2022 35 (L) >60 mL/min/1.73m2 Final     Comment:     Effective December 21, 2021 eGFRcr in adults is calculated using the 2021 CKD-EPI creatinine equation which includes age and gender (Eren et al., NEJM, DOI: 10.1056/KISUes3261872)   10/22/2019 49 (L) >60 mL/min/1.73m2 Final     Calcium   Date Value Ref Range Status   08/12/2022 9.6 8.2 - 9.6 mg/dL Final     Assessment/plan:    ICD-10-CM    1. Acute cystitis without hematuria  N30.00  repeat UA UC grew greater than 100,000 Enterobacter cloacae complex.  Start nitrofurantoin extended release 100 mg p.o. twice daily.  Push fluids.   2. Stage 3 chronic kidney disease, unspecified whether stage 3a or 3b CKD (H)  N18.30  Creatinine slightly elevated at 1.42.  Push fluids.  Follow-up BMP on Thursday.   3. Hypertension, unspecified type  I10  satisfactory.   4. History of TIA (transient ischemic attack) and stroke  Z86.73  Patient with recent episode most consistent with TIA.  History of carotic and iliac aneurysm.  She is currently not on aspirin.  She has allergies to antiplatelets.  Consider aspirin.  Follow-up with neurology outpatient.     5. Mild cognitive impairment  G31.84  At baseline.     This note has been dictated using voice recognition software. Any grammatical or context distortions are unintentional and inherent to the software    Electronically signed by: Farzana Garcia CNP           Sincerely,        Farzana Garcia NP

## 2022-08-16 ENCOUNTER — LAB REQUISITION (OUTPATIENT)
Dept: LAB | Facility: CLINIC | Age: 87
End: 2022-08-16
Payer: COMMERCIAL

## 2022-08-16 DIAGNOSIS — M50.30 OTHER CERVICAL DISC DEGENERATION, UNSPECIFIED CERVICAL REGION: ICD-10-CM
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
## 2022-08-16 DIAGNOSIS — N39.0 URINARY TRACT INFECTION, SITE NOT SPECIFIED: ICD-10-CM

## 2022-08-16 DIAGNOSIS — N30.00 ACUTE CYSTITIS WITHOUT HEMATURIA: ICD-10-CM

## 2022-08-18 ENCOUNTER — TRANSITIONAL CARE UNIT VISIT (OUTPATIENT)
Dept: GERIATRICS | Facility: CLINIC | Age: 87
End: 2022-08-18

## 2022-08-18 VITALS
BODY MASS INDEX: 28.48 KG/M2 | SYSTOLIC BLOOD PRESSURE: 132 MMHG | OXYGEN SATURATION: 98 % | HEART RATE: 92 BPM | DIASTOLIC BLOOD PRESSURE: 60 MMHG | RESPIRATION RATE: 16 BRPM | TEMPERATURE: 97.6 F | HEIGHT: 66 IN | WEIGHT: 177.2 LBS

## 2022-08-18 DIAGNOSIS — N18.30 STAGE 3 CHRONIC KIDNEY DISEASE, UNSPECIFIED WHETHER STAGE 3A OR 3B CKD (H): ICD-10-CM

## 2022-08-18 DIAGNOSIS — I10 HYPERTENSION, UNSPECIFIED TYPE: ICD-10-CM

## 2022-08-18 DIAGNOSIS — S30.810D EXCORIATION OF BUTTOCK, SUBSEQUENT ENCOUNTER: ICD-10-CM

## 2022-08-18 DIAGNOSIS — Z86.73 HISTORY OF TIA (TRANSIENT ISCHEMIC ATTACK) AND STROKE: ICD-10-CM

## 2022-08-18 DIAGNOSIS — N30.00 ACUTE CYSTITIS WITHOUT HEMATURIA: Primary | ICD-10-CM

## 2022-08-18 DIAGNOSIS — G31.84 MILD COGNITIVE IMPAIRMENT: ICD-10-CM

## 2022-08-18 LAB
ANION GAP SERPL CALCULATED.3IONS-SCNC: 10 MMOL/L (ref 7–15)
BUN SERPL-MCNC: 26.9 MG/DL (ref 8–23)
CALCIUM SERPL-MCNC: 9.7 MG/DL (ref 8.2–9.6)
CHLORIDE SERPL-SCNC: 95 MMOL/L (ref 98–107)
CREAT SERPL-MCNC: 1.41 MG/DL (ref 0.51–0.95)
DEPRECATED HCO3 PLAS-SCNC: 29 MMOL/L (ref 22–29)
GFR SERPL CREATININE-BSD FRML MDRD: 35 ML/MIN/1.73M2
GLUCOSE SERPL-MCNC: 90 MG/DL (ref 70–99)
POTASSIUM SERPL-SCNC: 4.9 MMOL/L (ref 3.4–5.3)
SODIUM SERPL-SCNC: 134 MMOL/L (ref 136–145)

## 2022-08-18 PROCEDURE — 80048 BASIC METABOLIC PNL TOTAL CA: CPT | Mod: ORL | Performed by: FAMILY MEDICINE

## 2022-08-18 PROCEDURE — 99316 NF DSCHRG MGMT 30 MIN+: CPT | Performed by: NURSE PRACTITIONER

## 2022-08-18 PROCEDURE — P9604 ONE-WAY ALLOW PRORATED TRIP: HCPCS | Mod: ORL | Performed by: FAMILY MEDICINE

## 2022-08-18 PROCEDURE — 36415 COLL VENOUS BLD VENIPUNCTURE: CPT | Mod: ORL | Performed by: FAMILY MEDICINE

## 2022-08-18 RX ORDER — ASPIRIN 81 MG/1
81 TABLET ORAL DAILY
COMMUNITY
End: 2023-01-14

## 2022-08-18 NOTE — LETTER
8/18/2022        RE: Rae Yates  [address] [place] [address]        [facility]    Code Status:  FULL CODE   Visit Type:   Chief Complaint   Patient presents with     Discharge Summary Nursing Home     Facility:  [facility] ([facility]) [[id number]]           Transitional Care Course: Rae Yates is a 89 year old female who I am seeing today for discharge from the TCU.  Patient recently hospitalized on 7/28/2022.  Patient hospitalized at [facility] secondary to UTI.  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  Per the records patient tripped and put her arms out to stop her self and fell forward hitting her head and landing on the left side of her body.  Patient reported crying for help for several hours.  She had had increasing weakness over the last few weeks prior to her fall including poor oral intake and urinary incontinence.  She does have some urinary incontinence at baseline.  She reported her edema was increased in her left leg.    Today patient sitting up in wheelchair. Patient with underlying cognitive impairment and is somewhat of a poor historian.  Patient recently admitted with UTI and history of fall.  She had completed her oral antibiotics.  Repeat UA UC positive for 100,000 Enterobacter cloacae complex.  She continues on nitrofurantoin.  She is currently afebrile.  She is voiding adequately.  She denies any burning or pain with urination.  Patient with history of recurrent TIAs.  With her recent fall she sustained bruising of the chin and left side.  Her aspirin was discontinued during her hospitalization.  She did have an episode during her TCU stay in which she became very lethargic and nonresponsive briefly.  Upon arousal she had stuttering speech.  Symptoms were most  consistent with TIA.  Her aspirin has been resumed.  Other than chronic kidney disease stage III with creatinine at 1.41 laboratory unremarkable.  Fluids are being encouraged. Hypertension.  Blood pressure satisfactory.  She does have stage II pressure to her coccyx.  She is receiving topical treatment.  She continues on Tylenol for pain.      Active Ambulatory Problems     Diagnosis Date Noted     Prolapsed urethral mucosa 04/13/2021     Postmenopausal atrophic vaginitis 04/13/2021     Joint Pain, Localized In The Hip      Hypertension      Diverticulosis      Diverticulitis 09/21/2017     Leukocytosis, unspecified type 09/21/2017     Hyponatremia 09/21/2017     SEGUNDO (acute kidney injury) (H) 09/21/2017     Multiple allergies      CKD (chronic kidney disease) stage 3, GFR 30-59 ml/min (H) 11/09/2018     Chronic pain 09/13/2011     Cerebral infarction (H) 08/03/2022     Plantar fasciitis 04/09/2012     B12 deficiency 02/23/2022     Anxiety 08/03/2022     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     Allergic rhinitis 08/03/2022     Actinic keratosis 08/03/2022     AAA (abdominal aortic aneurysm) (H) 08/03/2022     DJD (degenerative joint disease) of knee 09/19/2012     DJD (degenerative joint disease), lumbar 08/03/2022     Edema of left lower leg due to peripheral venous insufficiency 07/25/2018     Emphysema of lung (H) 02/06/2015     Female bladder prolapse, acquired 08/03/2022     FHx: AAA 11/01/1995     Macular degeneration 08/03/2022     Mild cognitive impairment with memory loss 04/22/2022     Obesity 08/03/2022     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     Prediabetes 08/03/2022     Pulmonary nodule 02/02/2015     Pure hypercholesterolemia 08/03/2022     Right internal carotid artery aneurysm 10/12/2015     Sciatica of left side 12/04/2013     Sialadenitis 08/03/2022     TIA (transient ischemic attack) 08/03/2022     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    Macrobid  Macrobid       Prednisone Other (See Comments)     Colon bleeding     Sulfamethoxazole-Trimethoprim Dizziness and Shortness Of Breath     Sulfamethoxazole-Trimethoprim [Sulfamethoxazole W/Trimethoprim] Dizziness and Shortness Of Breath     Cephalexin Nausea     NAUSEA HEADACHE, Keflex  NAUSEA HEADACHE  Keflex  NAUSEA HEADACHE  Keflex  NAUSEA HEADACHE  Keflex       Clindamycin Headache     Bloat  HA  heartburn  Bloat  HA  heartburn  Bloat  HA  heartburn     Clopidogrel Unknown and Diarrhea     PLAVIX  EPISTAAXIS  PLAVIX  EPISTAAXIS    PLAVIX  EPISTAAXIS       Clopidogrel Bisulfate [Clopidogrel] Diarrhea     PLAVIX, EPISTAAXIS     Cortisone Other (See Comments)     Other reaction(s): Flushing     Escitalopram Unknown     Other reaction(s): *Unknown     Levofloxacin Nausea     Weak, nausea, diarrhea  Weak, nausea, diarrhea  Weak, nausea, diarrhea     Metronidazole Unknown     Nitrofurantoin Monohyd/M-Cryst [Nitrofurantoin] Unknown     \"Spacey\" \"Funny\" sensation throughout the body/did not tolerate; No Rash.       Nitroimidazoles [Metronidazole] Nausea     FLAGYL, Weak, nausea, diarrhea     Nsaids (Non-Steroidal Anti-Inflammatory Drug) [Nsaids] Dizziness and Nausea     several     Olmesartan Dizziness     BENICAR  BENICAR    BENICAR     Pravastatin Headache and Nausea     Simvastatin Nausea     Weak  Weak    Weak     Rifampin Nausea and Vomiting     With fatigue, loss of appetite       All Meds and Allergies reviewed in the record at the facility and is the most up-to-date.    Current Outpatient Medications   Medication Sig     aspirin 81 MG EC " "tablet Take 81 mg by mouth daily     acetaminophen (TYLENOL) 500 MG tablet Take 500 mg by mouth every 4 hours as needed  mg; amt: 500mg PO HS; oral  Special Instructions: Max dose is 4000mg in 24     cyanocobalamin (VITAMIN B-12) 500 MCG tablet Take 500 mcg by mouth daily     furosemide (LASIX) 40 MG tablet Take 40 mg by mouth daily     gabapentin (NEURONTIN) 300 MG capsule Take 300 mg by mouth 2 times daily     hydrocortisone (CORTAID) 1 % external cream Apply topically 2 times daily     lisinopril (PRINIVIL,ZESTRIL) 10 MG tablet [LISINOPRIL (PRINIVIL,ZESTRIL) 10 MG TABLET] Take 1 tablet (10 mg total) by mouth 2 (two) times a day. (Patient taking differently: Take 40 mg by mouth daily)     Menthol-Methyl Salicylate (ICY HOT EXTRA STRENGTH) 10-30 % CREA Externally apply topically 3 times daily as needed     mirtazapine (REMERON) 30 MG tablet Take 30 mg by mouth At Bedtime     Multiple Vitamins-Minerals (PRESERVISION AREDS 2) CAPS Take 1 capsule by mouth 2 times daily preserVision AREDS-2 (vit c,q-ha-fflvg-lutein-zeaxan 250-90-40-1     polyvinyl alcohol-povidone PF (REFRESH) 1.4-0.6 % ophthalmic solution Place 1-2 drops into both eyes every 4 hours as needed for irritation     potassium chloride (KLOR-CON) 20 MEQ packet Take 20 mEq by mouth daily     No current facility-administered medications for this visit.       REVIEW OF SYSTEMS:   Review of Systems  10 point review of systems reviewed.  Pertinent positives in the HPI.    PHYSICAL EXAMINATION:   Physical Exam     Vital signs: /60   Pulse 92   Temp 97.6  F (36.4  C)   Resp 16   Ht 1.676 m (5' 6\")   Wt 80.4 kg (177 lb 3.2 oz)   SpO2 98%   BMI 28.60 kg/m    General: Awake, Alert,  conversant  HEENT:Pink conjunctiva, anicteric sclerae, moist oral mucosa.  Bruising to chin subsiding.  NECK: Supple  CARDIOVASCULAR: S1-S2 without murmur gallop.  RESPIRATORY: No wheezes rales or rhonchi.  BACK: No kyphosis of the thoracic spine  ABDOMEN: Soft,  with " positive bowel sounds  EXTREMITIES: Moves both upper and lower extremities.  Trace edema..  SKIN: Bruising improved to chin and left side.  She does have excoriation to her bottom with topical treatment in place.  NEUROLOGIC: Intact, pulses palpable  PSYCHIATRIC: Cognitive impairment noted.  Poor recall of events.      Labs:  All labs reviewed in the nursing home record and Epic   @  Lab Results   Component Value Date    WBC 7.8 08/04/2022     Lab Results   Component Value Date    RBC 3.48 08/04/2022     Lab Results   Component Value Date    HGB 10.7 08/04/2022     Lab Results   Component Value Date    HCT 33.4 08/04/2022     Lab Results   Component Value Date    MCV 96 08/04/2022     Lab Results   Component Value Date    MCH 30.7 08/04/2022     Lab Results   Component Value Date    MCHC 32.0 08/04/2022     Lab Results   Component Value Date    RDW 13.9 08/04/2022     Lab Results   Component Value Date     08/04/2022        @Last Comprehensive Metabolic Panel:  Sodium   Date Value Ref Range Status   08/18/2022 134 (L) 136 - 145 mmol/L Final     Potassium   Date Value Ref Range Status   08/18/2022 4.9 3.4 - 5.3 mmol/L Final   10/22/2019 4.4 3.5 - 5.0 mmol/L Final     Chloride   Date Value Ref Range Status   08/18/2022 95 (L) 98 - 107 mmol/L Final   10/22/2019 96 (L) 98 - 107 mmol/L Final     Carbon Dioxide (CO2)   Date Value Ref Range Status   08/18/2022 29 22 - 29 mmol/L Final   10/22/2019 25 22 - 31 mmol/L Final     Anion Gap   Date Value Ref Range Status   08/18/2022 10 7 - 15 mmol/L Final   10/22/2019 12 5 - 18 mmol/L Final     Glucose   Date Value Ref Range Status   08/18/2022 90 70 - 99 mg/dL Final   10/22/2019 115 70 - 125 mg/dL Final     Urea Nitrogen   Date Value Ref Range Status   08/18/2022 26.9 (H) 8.0 - 23.0 mg/dL Final   10/22/2019 14 8 - 28 mg/dL Final     Creatinine   Date Value Ref Range Status   08/18/2022 1.41 (H) 0.51 - 0.95 mg/dL Final     GFR Estimate   Date Value Ref Range Status    08/18/2022 35 (L) >60 mL/min/1.73m2 Final     Comment:     Effective December 21, 2021 eGFRcr in adults is calculated using the 2021 CKD-EPI creatinine equation which includes age and gender (Eren et al., NEJM, DOI: 10.1056/AFJDxt6921119)   10/22/2019 49 (L) >60 mL/min/1.73m2 Final     Calcium   Date Value Ref Range Status   08/18/2022 9.7 (H) 8.2 - 9.6 mg/dL Final     Assessment/plan:      ICD-10-CM    1. Acute cystitis without hematuria  N30.00 repeat UA UC grew greater than 100,000 Enterobacter cloacae complex.  Start nitrofurantoin extended release 100 mg p.o. twice daily.  Push fluids.    2. Stage 3 chronic kidney disease, unspecified whether stage 3a or 3b CKD (H)  N18.30  Creatinine slightly elevated at 1.41.  Fluids are being encouraged.  She should have a repeat in 1 week.   3. Hypertension, unspecified type  I10  satisfactory.   4. History of TIA (transient ischemic attack) and stroke  Z86.73  Aspirin resumed at 81 mg daily.  Follow-up  outpatient with neurology.   5. Mild cognitive impairment  G31.84  At baseline.   6. Excoriation of buttock, subsequent encounter  S30.810D  Continue topical treatment.     Okay to DC home with current meds and treatments.  Home PT, OT, home health aide and RN for medication management and wound care.  Follow-up with primary care provider in 1 week.  Follow-up with neurology outpatient.  Patient should have a repeat BMP with primary care follow-up.    DISCHARGE PLAN/FACE TO FACE:  I certify that this patient is under my care and that I, or a nurse practitioner or physician's assistant working with me, had a face-to-face encounter that meets the physician face-to-face encounter requirements with this patient.       I certify that, based on my findings, the following services are medically necessary home health services.    My clinical findings support the need for the above skilled services.    This patient is homebound because: Recent fall with UTI.    The patient is, or  has been, under my care and I have initiated the establishment of the plan of care. This patient will be followed by a physician who will periodically review the plan of care.    Total time spent for this visit was 35 minutes for greater than 50% of time spent in counseling and coordination of care as well as reviewing above with daughter including discharge medications, home care services and follow-ups.    This note has been dictated using voice recognition software. Any grammatical or context distortions are unintentional and inherent to the software    Electronically signed by: Farzana Garcia CNP           Sincerely,        Farzana Garcia NP

## 2022-08-18 NOTE — PROGRESS NOTES
[facility] GERIATRIC SERVICES    Code Status:  FULL CODE   Visit Type:   Chief Complaint   Patient presents with     Nursing Home Acute     TCU Follow up     Facility:  [facility] ([facility]) [[id number]]           Transitional Care Course: Rae Yates is a 89 year old female who I am seeing today for follow-up on the TCU.  Patient recently hospitalized on 7/28/2022.  Patient hospitalized at [facility] secondary to UTI.  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  Per the records patient tripped and put her arms out to stop her self and fell forward hitting her head and landing on the left side of her body.  Patient reported crying for help for several hours.  She had had increasing weakness over the last few weeks prior to her fall including poor oral intake and urinary incontinence.  She does have some urinary incontinence at baseline.  She reported her edema was increased in her left leg.    Today patient sitting up in wheelchair.  Patient with underlying cognitive impairment and is somewhat of a poor historian.  Over the weekend patient had an episode in which she became very lethargic.  She had a nonresponsive episode however did respond to sternal rub.  Upon arousal she had stuttering speech.  Patient with a history of recurrent TIAs.  Symptoms appear most consistent with possible TIA.  She did undergo laboratory.  CBC unremarkable.  She admitted with UTI.  She had completed her oral antibiotics.  Repeat UA appeared positive.  Today UC grew greater than 100,000 Enterobacter cloacae complex.  Patient currently afebrile.  Chest x-ray also obtain which was negative. Stage III chronic kidney disease.  Creatinine elevated at 1.42.  Fluids are being encouraged.  Today she has returned to her baseline.  She is moving all extremities  without difficulty.  Speech is clear.  Blood pressure satisfactory.  She continues on Tylenol for pain.  She does have breakdown to her bottom in which she is receiving topical treatment.  She was having some left shoulder pain however this is improving.  10    Active Ambulatory Problems     Diagnosis Date Noted     Prolapsed urethral mucosa 04/13/2021     Postmenopausal atrophic vaginitis 04/13/2021     Joint Pain, Localized In The Hip      Hypertension      Diverticulosis      Diverticulitis 09/21/2017     Leukocytosis, unspecified type 09/21/2017     Hyponatremia 09/21/2017     SEGUNDO (acute kidney injury) (H) 09/21/2017     Multiple allergies      CKD (chronic kidney disease) stage 3, GFR 30-59 ml/min (H) 11/09/2018     Chronic pain 09/13/2011     Cerebral infarction (H) 08/03/2022     Plantar fasciitis 04/09/2012     B12 deficiency 02/23/2022     Anxiety 08/03/2022     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     Allergic rhinitis 08/03/2022     Actinic keratosis 08/03/2022     AAA (abdominal aortic aneurysm) (H) 08/03/2022     DJD (degenerative joint disease) of knee 09/19/2012     DJD (degenerative joint disease), lumbar 08/03/2022     Edema of left lower leg due to peripheral venous insufficiency 07/25/2018     Emphysema of lung (H) 02/06/2015     Female bladder prolapse, acquired 08/03/2022     FHx: AAA 11/01/1995     Macular degeneration 08/03/2022     Mild cognitive impairment with memory loss 04/22/2022     Obesity 08/03/2022     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     Prediabetes 08/03/2022     Pulmonary nodule 02/02/2015     Pure hypercholesterolemia 08/03/2022     Right internal carotid artery aneurysm 10/12/2015     Sciatica of left side 12/04/2013     Sialadenitis 08/03/2022     TIA (transient ischemic attack) 08/03/2022     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    Macrobid  Macrobid       Prednisone Other (See Comments)     Colon bleeding     Sulfamethoxazole-Trimethoprim Dizziness and Shortness Of Breath     Sulfamethoxazole-Trimethoprim [Sulfamethoxazole W/Trimethoprim] Dizziness and Shortness Of Breath     Cephalexin Nausea     NAUSEA HEADACHE, Keflex  NAUSEA HEADACHE  Keflex  NAUSEA HEADACHE  Keflex  NAUSEA HEADACHE  Keflex       Clindamycin Headache     Bloat  HA  heartburn  Bloat  HA  heartburn  Bloat  HA  heartburn     Clopidogrel Unknown and Diarrhea     PLAVIX  EPISTAAXIS  PLAVIX  EPISTAAXIS    PLAVIX  EPISTAAXIS       Clopidogrel Bisulfate [Clopidogrel] Diarrhea     PLAVIX, EPISTAAXIS     Cortisone Other (See Comments)     Other reaction(s): Flushing     Escitalopram Unknown     Other reaction(s): *Unknown     Levofloxacin Nausea     Weak, nausea, diarrhea  Weak, nausea, diarrhea  Weak, nausea, diarrhea     Metronidazole Unknown     Nitrofurantoin Monohyd/M-Cryst [Nitrofurantoin] Unknown     \"Spacey\" \"Funny\" sensation throughout the body/did not tolerate; No Rash.       Nitroimidazoles [Metronidazole] Nausea     FLAGYL, Weak, nausea, diarrhea     Nsaids (Non-Steroidal Anti-Inflammatory Drug) [Nsaids] Dizziness and Nausea     several     Olmesartan Dizziness     BENICAR  BENICAR    BENICAR     Pravastatin Headache and Nausea     Simvastatin Nausea     Weak  Weak    Weak     Rifampin Nausea and Vomiting     With fatigue, loss of appetite       All Meds and Allergies reviewed in the record at the facility and is the most up-to-date.    Current Outpatient Medications   Medication Sig     acetaminophen (TYLENOL) 500 MG tablet Take 500 mg by mouth every 4 hours as needed  mg; amt: 500mg " "PO HS; oral  Special Instructions: Max dose is 4000mg in 24     cyanocobalamin (VITAMIN B-12) 500 MCG tablet Take 500 mcg by mouth daily     furosemide (LASIX) 40 MG tablet Take 40 mg by mouth daily     gabapentin (NEURONTIN) 300 MG capsule Take 300 mg by mouth 2 times daily     hydrocortisone (CORTAID) 1 % external cream Apply topically 2 times daily     lisinopril (PRINIVIL,ZESTRIL) 10 MG tablet [LISINOPRIL (PRINIVIL,ZESTRIL) 10 MG TABLET] Take 1 tablet (10 mg total) by mouth 2 (two) times a day. (Patient taking differently: Take 40 mg by mouth daily)     Menthol-Methyl Salicylate (ICY HOT EXTRA STRENGTH) 10-30 % CREA Externally apply topically 3 times daily as needed     mirtazapine (REMERON) 30 MG tablet Take 30 mg by mouth At Bedtime     Multiple Vitamins-Minerals (PRESERVISION AREDS 2) CAPS Take 1 capsule by mouth 2 times daily preserVision AREDS-2 (vit c,i-no-ahlfr-lutein-zeaxan 250-90-40-1     polyvinyl alcohol-povidone PF (REFRESH) 1.4-0.6 % ophthalmic solution Place 1-2 drops into both eyes every 4 hours as needed for irritation     potassium chloride (KLOR-CON) 20 MEQ packet Take 20 mEq by mouth daily     No current facility-administered medications for this visit.       REVIEW OF SYSTEMS:   Review of Systems  10 point review of systems reviewed.  Pertinent positives in the HPI.    PHYSICAL EXAMINATION:  Physical Exam     Vital signs: /65   Pulse 103   Temp 97  F (36.1  C)   Resp 16   Ht 1.676 m (5' 6\")   Wt 79.7 kg (175 lb 9.6 oz)   SpO2 95%   BMI 28.34 kg/m    General: Awake, Alert,  conversant  HEENT:Pink conjunctiva, anicteric sclerae, moist oral mucosa.   NECK: Supple  CARDIOVASCULAR: S1-S2 without murmur gallop.  RESPIRATORY: No wheezes rales or rhonchi.  BACK: No kyphosis of the thoracic spine  ABDOMEN: Soft,  with positive bowel sounds  EXTREMITIES: Moves both upper and lower extremities.  She is able to raise her left upper extremity overhead.  Trace lower extremity edema.  SKIN: " Bruising improved to chin and left side.  She does have excoriation to her bottom and has topical treatment in place.  NEUROLOGIC: Intact, pulses palpable  PSYCHIATRIC: Cognitive impairment noted.  Poor recall of events.      Labs:  All labs reviewed in the nursing home record and Epic   @  Lab Results   Component Value Date    WBC 7.8 08/04/2022     Lab Results   Component Value Date    RBC 3.48 08/04/2022     Lab Results   Component Value Date    HGB 10.7 08/04/2022     Lab Results   Component Value Date    HCT 33.4 08/04/2022     Lab Results   Component Value Date    MCV 96 08/04/2022     Lab Results   Component Value Date    MCH 30.7 08/04/2022     Lab Results   Component Value Date    MCHC 32.0 08/04/2022     Lab Results   Component Value Date    RDW 13.9 08/04/2022     Lab Results   Component Value Date     08/04/2022        @Last Comprehensive Metabolic Panel:  Sodium   Date Value Ref Range Status   08/12/2022 137 136 - 145 mmol/L Final     Potassium   Date Value Ref Range Status   08/12/2022 5.0 3.4 - 5.3 mmol/L Final   10/22/2019 4.4 3.5 - 5.0 mmol/L Final     Chloride   Date Value Ref Range Status   08/12/2022 98 98 - 107 mmol/L Final   10/22/2019 96 (L) 98 - 107 mmol/L Final     Carbon Dioxide (CO2)   Date Value Ref Range Status   08/12/2022 19 (L) 22 - 29 mmol/L Final   10/22/2019 25 22 - 31 mmol/L Final     Anion Gap   Date Value Ref Range Status   08/12/2022 20 (H) 7 - 15 mmol/L Final   10/22/2019 12 5 - 18 mmol/L Final     Glucose   Date Value Ref Range Status   08/12/2022 111 (H) 70 - 99 mg/dL Final   10/22/2019 115 70 - 125 mg/dL Final     Urea Nitrogen   Date Value Ref Range Status   08/12/2022 31.9 (H) 8.0 - 23.0 mg/dL Final   10/22/2019 14 8 - 28 mg/dL Final     Creatinine   Date Value Ref Range Status   08/12/2022 1.42 (H) 0.51 - 0.95 mg/dL Final     GFR Estimate   Date Value Ref Range Status   08/12/2022 35 (L) >60 mL/min/1.73m2 Final     Comment:     Effective December 21, 2021 eGFRcr  in adults is calculated using the 2021 CKD-EPI creatinine equation which includes age and gender (Eren et al., NEJ, DOI: 10.1056/PYSLoq5755112)   10/22/2019 49 (L) >60 mL/min/1.73m2 Final     Calcium   Date Value Ref Range Status   08/12/2022 9.6 8.2 - 9.6 mg/dL Final     Assessment/plan:    ICD-10-CM    1. Acute cystitis without hematuria  N30.00  repeat UA UC grew greater than 100,000 Enterobacter cloacae complex.  Start nitrofurantoin extended release 100 mg p.o. twice daily.  Push fluids.   2. Stage 3 chronic kidney disease, unspecified whether stage 3a or 3b CKD (H)  N18.30  Creatinine slightly elevated at 1.42.  Push fluids.  Follow-up BMP on Thursday.   3. Hypertension, unspecified type  I10  satisfactory.   4. History of TIA (transient ischemic attack) and stroke  Z86.73  Patient with recent episode most consistent with TIA.  History of carotic and iliac aneurysm.  She is currently not on aspirin.  She has allergies to antiplatelets.  Consider aspirin.  Follow-up with neurology outpatient.     5. Mild cognitive impairment  G31.84  At baseline.     This note has been dictated using voice recognition software. Any grammatical or context distortions are unintentional and inherent to the software    Electronically signed by: Farzana Garcia CNP

## 2022-08-19 NOTE — PROGRESS NOTES
[facility] GERIATRIC SERVICES    Code Status:  FULL CODE   Visit Type:   Chief Complaint   Patient presents with     Discharge Summary Nursing Home     Facility:  [facility] ([facility]) [[id number]]           Transitional Care Course: Rae Yates is a 89 year old female who I am seeing today for discharge from the TCU.  Patient recently hospitalized on 7/28/2022.  Patient hospitalized at [facility] secondary to UTI.  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  Per the records patient tripped and put her arms out to stop her self and fell forward hitting her head and landing on the left side of her body.  Patient reported crying for help for several hours.  She had had increasing weakness over the last few weeks prior to her fall including poor oral intake and urinary incontinence.  She does have some urinary incontinence at baseline.  She reported her edema was increased in her left leg.    Today patient sitting up in wheelchair. Patient with underlying cognitive impairment and is somewhat of a poor historian.  Patient recently admitted with UTI and history of fall.  She had completed her oral antibiotics.  Repeat UA UC positive for 100,000 Enterobacter cloacae complex.  She continues on nitrofurantoin.  She is currently afebrile.  She is voiding adequately.  She denies any burning or pain with urination.  Patient with history of recurrent TIAs.  With her recent fall she sustained bruising of the chin and left side.  Her aspirin was discontinued during her hospitalization.  She did have an episode during her TCU stay in which she became very lethargic and nonresponsive briefly.  Upon arousal she had stuttering speech.  Symptoms were most consistent with TIA.  Her aspirin has been resumed.  Other than chronic kidney disease stage  III with creatinine at 1.41 laboratory unremarkable.  Fluids are being encouraged. Hypertension.  Blood pressure satisfactory.  She does have stage II pressure to her coccyx.  She is receiving topical treatment.  She continues on Tylenol for pain.      Active Ambulatory Problems     Diagnosis Date Noted     Prolapsed urethral mucosa 04/13/2021     Postmenopausal atrophic vaginitis 04/13/2021     Joint Pain, Localized In The Hip      Hypertension      Diverticulosis      Diverticulitis 09/21/2017     Leukocytosis, unspecified type 09/21/2017     Hyponatremia 09/21/2017     SEGUNDO (acute kidney injury) (H) 09/21/2017     Multiple allergies      CKD (chronic kidney disease) stage 3, GFR 30-59 ml/min (H) 11/09/2018     Chronic pain 09/13/2011     Cerebral infarction (H) 08/03/2022     Plantar fasciitis 04/09/2012     B12 deficiency 02/23/2022     Anxiety 08/03/2022     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     Allergic rhinitis 08/03/2022     Actinic keratosis 08/03/2022     AAA (abdominal aortic aneurysm) (H) 08/03/2022     DJD (degenerative joint disease) of knee 09/19/2012     DJD (degenerative joint disease), lumbar 08/03/2022     Edema of left lower leg due to peripheral venous insufficiency 07/25/2018     Emphysema of lung (H) 02/06/2015     Female bladder prolapse, acquired 08/03/2022     FHx: AAA 11/01/1995     Macular degeneration 08/03/2022     Mild cognitive impairment with memory loss 04/22/2022     Obesity 08/03/2022     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     Prediabetes 08/03/2022     Pulmonary nodule 02/02/2015     Pure hypercholesterolemia 08/03/2022     Right internal carotid artery aneurysm 10/12/2015     Sciatica of left side 12/04/2013     Sialadenitis 08/03/2022     TIA (transient ischemic attack) 08/03/2022     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    Macrobid  Macrobid       Prednisone Other (See Comments)     Colon bleeding     Sulfamethoxazole-Trimethoprim Dizziness and Shortness Of Breath     Sulfamethoxazole-Trimethoprim [Sulfamethoxazole W/Trimethoprim] Dizziness and Shortness Of Breath     Cephalexin Nausea     NAUSEA HEADACHE, Keflex  NAUSEA HEADACHE  Keflex  NAUSEA HEADACHE  Keflex  NAUSEA HEADACHE  Keflex       Clindamycin Headache     Bloat  HA  heartburn  Bloat  HA  heartburn  Bloat  HA  heartburn     Clopidogrel Unknown and Diarrhea     PLAVIX  EPISTAAXIS  PLAVIX  EPISTAAXIS    PLAVIX  EPISTAAXIS       Clopidogrel Bisulfate [Clopidogrel] Diarrhea     PLAVIX, EPISTAAXIS     Cortisone Other (See Comments)     Other reaction(s): Flushing     Escitalopram Unknown     Other reaction(s): *Unknown     Levofloxacin Nausea     Weak, nausea, diarrhea  Weak, nausea, diarrhea  Weak, nausea, diarrhea     Metronidazole Unknown     Nitrofurantoin Monohyd/M-Cryst [Nitrofurantoin] Unknown     \"Spacey\" \"Funny\" sensation throughout the body/did not tolerate; No Rash.       Nitroimidazoles [Metronidazole] Nausea     FLAGYL, Weak, nausea, diarrhea     Nsaids (Non-Steroidal Anti-Inflammatory Drug) [Nsaids] Dizziness and Nausea     several     Olmesartan Dizziness     BENICAR  BENICAR    BENICAR     Pravastatin Headache and Nausea     Simvastatin Nausea     Weak  Weak    Weak     Rifampin Nausea and Vomiting     With fatigue, loss of appetite       All Meds and Allergies reviewed in the record at the facility and is the most up-to-date.    Current Outpatient Medications   Medication Sig     aspirin 81 MG EC tablet Take 81 mg by mouth daily     acetaminophen (TYLENOL) 500 MG tablet Take 500 mg by mouth " "every 4 hours as needed  mg; amt: 500mg PO HS; oral  Special Instructions: Max dose is 4000mg in 24     cyanocobalamin (VITAMIN B-12) 500 MCG tablet Take 500 mcg by mouth daily     furosemide (LASIX) 40 MG tablet Take 40 mg by mouth daily     gabapentin (NEURONTIN) 300 MG capsule Take 300 mg by mouth 2 times daily     hydrocortisone (CORTAID) 1 % external cream Apply topically 2 times daily     lisinopril (PRINIVIL,ZESTRIL) 10 MG tablet [LISINOPRIL (PRINIVIL,ZESTRIL) 10 MG TABLET] Take 1 tablet (10 mg total) by mouth 2 (two) times a day. (Patient taking differently: Take 40 mg by mouth daily)     Menthol-Methyl Salicylate (ICY HOT EXTRA STRENGTH) 10-30 % CREA Externally apply topically 3 times daily as needed     mirtazapine (REMERON) 30 MG tablet Take 30 mg by mouth At Bedtime     Multiple Vitamins-Minerals (PRESERVISION AREDS 2) CAPS Take 1 capsule by mouth 2 times daily preserVision AREDS-2 (vit c,s-mk-fqkza-lutein-zeaxan 250-90-40-1     polyvinyl alcohol-povidone PF (REFRESH) 1.4-0.6 % ophthalmic solution Place 1-2 drops into both eyes every 4 hours as needed for irritation     potassium chloride (KLOR-CON) 20 MEQ packet Take 20 mEq by mouth daily     No current facility-administered medications for this visit.       REVIEW OF SYSTEMS:   Review of Systems  10 point review of systems reviewed.  Pertinent positives in the HPI.    PHYSICAL EXAMINATION:   Physical Exam     Vital signs: /60   Pulse 92   Temp 97.6  F (36.4  C)   Resp 16   Ht 1.676 m (5' 6\")   Wt 80.4 kg (177 lb 3.2 oz)   SpO2 98%   BMI 28.60 kg/m    General: Awake, Alert,  conversant  HEENT:Pink conjunctiva, anicteric sclerae, moist oral mucosa.  Bruising to chin subsiding.  NECK: Supple  CARDIOVASCULAR: S1-S2 without murmur gallop.  RESPIRATORY: No wheezes rales or rhonchi.  BACK: No kyphosis of the thoracic spine  ABDOMEN: Soft,  with positive bowel sounds  EXTREMITIES: Moves both upper and lower extremities.  Trace " edema..  SKIN: Bruising improved to chin and left side.  She does have excoriation to her bottom with topical treatment in place.  NEUROLOGIC: Intact, pulses palpable  PSYCHIATRIC: Cognitive impairment noted.  Poor recall of events.      Labs:  All labs reviewed in the nursing home record and Epic   @  Lab Results   Component Value Date    WBC 7.8 08/04/2022     Lab Results   Component Value Date    RBC 3.48 08/04/2022     Lab Results   Component Value Date    HGB 10.7 08/04/2022     Lab Results   Component Value Date    HCT 33.4 08/04/2022     Lab Results   Component Value Date    MCV 96 08/04/2022     Lab Results   Component Value Date    MCH 30.7 08/04/2022     Lab Results   Component Value Date    MCHC 32.0 08/04/2022     Lab Results   Component Value Date    RDW 13.9 08/04/2022     Lab Results   Component Value Date     08/04/2022        @Last Comprehensive Metabolic Panel:  Sodium   Date Value Ref Range Status   08/18/2022 134 (L) 136 - 145 mmol/L Final     Potassium   Date Value Ref Range Status   08/18/2022 4.9 3.4 - 5.3 mmol/L Final   10/22/2019 4.4 3.5 - 5.0 mmol/L Final     Chloride   Date Value Ref Range Status   08/18/2022 95 (L) 98 - 107 mmol/L Final   10/22/2019 96 (L) 98 - 107 mmol/L Final     Carbon Dioxide (CO2)   Date Value Ref Range Status   08/18/2022 29 22 - 29 mmol/L Final   10/22/2019 25 22 - 31 mmol/L Final     Anion Gap   Date Value Ref Range Status   08/18/2022 10 7 - 15 mmol/L Final   10/22/2019 12 5 - 18 mmol/L Final     Glucose   Date Value Ref Range Status   08/18/2022 90 70 - 99 mg/dL Final   10/22/2019 115 70 - 125 mg/dL Final     Urea Nitrogen   Date Value Ref Range Status   08/18/2022 26.9 (H) 8.0 - 23.0 mg/dL Final   10/22/2019 14 8 - 28 mg/dL Final     Creatinine   Date Value Ref Range Status   08/18/2022 1.41 (H) 0.51 - 0.95 mg/dL Final     GFR Estimate   Date Value Ref Range Status   08/18/2022 35 (L) >60 mL/min/1.73m2 Final     Comment:     Effective December 21, 2021  eGFRcr in adults is calculated using the 2021 CKD-EPI creatinine equation which includes age and gender (Eren et al., NE, DOI: 10.1056/LNKEvr9353935)   10/22/2019 49 (L) >60 mL/min/1.73m2 Final     Calcium   Date Value Ref Range Status   08/18/2022 9.7 (H) 8.2 - 9.6 mg/dL Final     Assessment/plan:      ICD-10-CM    1. Acute cystitis without hematuria  N30.00 repeat UA UC grew greater than 100,000 Enterobacter cloacae complex.  Start nitrofurantoin extended release 100 mg p.o. twice daily.  Push fluids.    2. Stage 3 chronic kidney disease, unspecified whether stage 3a or 3b CKD (H)  N18.30  Creatinine slightly elevated at 1.41.  Fluids are being encouraged.  She should have a repeat in 1 week.   3. Hypertension, unspecified type  I10  satisfactory.   4. History of TIA (transient ischemic attack) and stroke  Z86.73  Aspirin resumed at 81 mg daily.  Follow-up  outpatient with neurology.   5. Mild cognitive impairment  G31.84  At baseline.   6. Excoriation of buttock, subsequent encounter  S30.810D  Continue topical treatment.     Okay to PA home with current meds and treatments.  Home PT, OT, home health aide and RN for medication management and wound care.  Follow-up with primary care provider in 1 week.  Follow-up with neurology outpatient.  Patient should have a repeat BMP with primary care follow-up.    DISCHARGE PLAN/FACE TO FACE:  I certify that this patient is under my care and that I, or a nurse practitioner or physician's assistant working with me, had a face-to-face encounter that meets the physician face-to-face encounter requirements with this patient.       I certify that, based on my findings, the following services are medically necessary home health services.    My clinical findings support the need for the above skilled services.    This patient is homebound because: Recent fall with UTI.    The patient is, or has been, under my care and I have initiated the establishment of the plan of care. This  patient will be followed by a physician who will periodically review the plan of care.    Total time spent for this visit was 35 minutes for greater than 50% of time spent in counseling and coordination of care as well as reviewing above with daughter including discharge medications, home care services and follow-ups.    This note has been dictated using voice recognition software. Any grammatical or context distortions are unintentional and inherent to the software    Electronically signed by: Farzana Garcia CNP

## 2022-11-15 ENCOUNTER — LAB REQUISITION (OUTPATIENT)
Dept: LAB | Facility: CLINIC | Age: 87
End: 2022-11-15
Payer: COMMERCIAL

## 2022-11-15 DIAGNOSIS — Z11.1 ENCOUNTER FOR SCREENING FOR RESPIRATORY TUBERCULOSIS: ICD-10-CM

## 2022-11-15 PROCEDURE — 86481 TB AG RESPONSE T-CELL SUSP: CPT | Mod: ORL | Performed by: FAMILY MEDICINE

## 2022-11-15 PROCEDURE — 36415 COLL VENOUS BLD VENIPUNCTURE: CPT | Mod: ORL | Performed by: FAMILY MEDICINE

## 2022-11-17 LAB
GAMMA INTERFERON BACKGROUND BLD IA-ACNC: 0.04 IU/ML
M TB IFN-G BLD-IMP: POSITIVE
M TB IFN-G CD4+ BCKGRND COR BLD-ACNC: 9.77 IU/ML
MITOGEN IGNF BCKGRD COR BLD-ACNC: 0.27 IU/ML
MITOGEN IGNF BCKGRD COR BLD-ACNC: 1.76 IU/ML
QUANTIFERON MITOGEN: 9.81 IU/ML
QUANTIFERON NIL TUBE: 0.04 IU/ML
QUANTIFERON TB1 TUBE: 0.31 IU/ML
QUANTIFERON TB2 TUBE: 1.8

## 2022-11-30 ENCOUNTER — LAB REQUISITION (OUTPATIENT)
Dept: LAB | Facility: CLINIC | Age: 87
End: 2022-11-30
Payer: COMMERCIAL

## 2022-11-30 DIAGNOSIS — R30.0 DYSURIA: ICD-10-CM

## 2022-11-30 PROCEDURE — 87086 URINE CULTURE/COLONY COUNT: CPT | Mod: ORL | Performed by: FAMILY MEDICINE

## 2022-12-01 LAB — BACTERIA UR CULT: ABNORMAL

## 2022-12-16 ENCOUNTER — LAB REQUISITION (OUTPATIENT)
Dept: LAB | Facility: CLINIC | Age: 87
End: 2022-12-16
Payer: COMMERCIAL

## 2022-12-16 DIAGNOSIS — N30.00 ACUTE CYSTITIS WITHOUT HEMATURIA: ICD-10-CM

## 2022-12-16 PROCEDURE — 87086 URINE CULTURE/COLONY COUNT: CPT | Mod: ORL | Performed by: NURSE PRACTITIONER

## 2022-12-19 LAB — BACTERIA UR CULT: ABNORMAL

## 2023-01-14 ENCOUNTER — HOSPITAL ENCOUNTER (EMERGENCY)
Facility: CLINIC | Age: 88
Discharge: NURSING, REHAB OR LONG-TERM CARE | End: 2023-01-14
Attending: FAMILY MEDICINE | Admitting: FAMILY MEDICINE
Payer: COMMERCIAL

## 2023-01-14 ENCOUNTER — APPOINTMENT (OUTPATIENT)
Dept: CT IMAGING | Facility: CLINIC | Age: 88
End: 2023-01-14
Attending: FAMILY MEDICINE
Payer: COMMERCIAL

## 2023-01-14 VITALS
WEIGHT: 175 LBS | SYSTOLIC BLOOD PRESSURE: 168 MMHG | TEMPERATURE: 98.2 F | DIASTOLIC BLOOD PRESSURE: 65 MMHG | HEART RATE: 67 BPM | OXYGEN SATURATION: 99 % | RESPIRATION RATE: 18 BRPM | BODY MASS INDEX: 28.25 KG/M2

## 2023-01-14 DIAGNOSIS — R42 VERTIGO: ICD-10-CM

## 2023-01-14 LAB
ALBUMIN UR-MCNC: NEGATIVE MG/DL
ANION GAP SERPL CALCULATED.3IONS-SCNC: 3 MMOL/L (ref 3–14)
APPEARANCE UR: CLEAR
BASOPHILS # BLD AUTO: 0.1 10E3/UL (ref 0–0.2)
BASOPHILS NFR BLD AUTO: 1 %
BILIRUB UR QL STRIP: NEGATIVE
BUN SERPL-MCNC: 24 MG/DL (ref 7–30)
CALCIUM SERPL-MCNC: 8.7 MG/DL (ref 8.5–10.1)
CHLORIDE BLD-SCNC: 104 MMOL/L (ref 94–109)
CO2 SERPL-SCNC: 31 MMOL/L (ref 20–32)
COLOR UR AUTO: YELLOW
CREAT SERPL-MCNC: 1.1 MG/DL (ref 0.52–1.04)
EOSINOPHIL # BLD AUTO: 0.2 10E3/UL (ref 0–0.7)
EOSINOPHIL NFR BLD AUTO: 2 %
ERYTHROCYTE [DISTWIDTH] IN BLOOD BY AUTOMATED COUNT: 13.7 % (ref 10–15)
GFR SERPL CREATININE-BSD FRML MDRD: 47 ML/MIN/1.73M2
GLUCOSE BLD-MCNC: 111 MG/DL (ref 70–99)
GLUCOSE BLDC GLUCOMTR-MCNC: 111 MG/DL (ref 70–99)
GLUCOSE UR STRIP-MCNC: NEGATIVE MG/DL
HCT VFR BLD AUTO: 36.2 % (ref 35–47)
HGB BLD-MCNC: 11.7 G/DL (ref 11.7–15.7)
HGB UR QL STRIP: NEGATIVE
HYALINE CASTS: 1 /LPF
IMM GRANULOCYTES # BLD: 0.1 10E3/UL
IMM GRANULOCYTES NFR BLD: 1 %
KETONES UR STRIP-MCNC: NEGATIVE MG/DL
LEUKOCYTE ESTERASE UR QL STRIP: ABNORMAL
LYMPHOCYTES # BLD AUTO: 1.4 10E3/UL (ref 0.8–5.3)
LYMPHOCYTES NFR BLD AUTO: 13 %
MCH RBC QN AUTO: 30.4 PG (ref 26.5–33)
MCHC RBC AUTO-ENTMCNC: 32.3 G/DL (ref 31.5–36.5)
MCV RBC AUTO: 94 FL (ref 78–100)
MONOCYTES # BLD AUTO: 0.9 10E3/UL (ref 0–1.3)
MONOCYTES NFR BLD AUTO: 8 %
NEUTROPHILS # BLD AUTO: 8 10E3/UL (ref 1.6–8.3)
NEUTROPHILS NFR BLD AUTO: 75 %
NITRATE UR QL: NEGATIVE
NRBC # BLD AUTO: 0 10E3/UL
NRBC BLD AUTO-RTO: 0 /100
PH UR STRIP: 6.5 [PH] (ref 5–7)
PLATELET # BLD AUTO: 247 10E3/UL (ref 150–450)
POTASSIUM BLD-SCNC: 4.1 MMOL/L (ref 3.4–5.3)
RBC # BLD AUTO: 3.85 10E6/UL (ref 3.8–5.2)
RBC URINE: 2 /HPF
SODIUM SERPL-SCNC: 138 MMOL/L (ref 133–144)
SP GR UR STRIP: 1.01 (ref 1–1.03)
SQUAMOUS EPITHELIAL: 7 /HPF
TROPONIN I SERPL HS-MCNC: 8 NG/L
UROBILINOGEN UR STRIP-MCNC: NORMAL MG/DL
WBC # BLD AUTO: 10.5 10E3/UL (ref 4–11)
WBC URINE: 13 /HPF

## 2023-01-14 PROCEDURE — 36415 COLL VENOUS BLD VENIPUNCTURE: CPT | Performed by: FAMILY MEDICINE

## 2023-01-14 PROCEDURE — 81001 URINALYSIS AUTO W/SCOPE: CPT | Performed by: FAMILY MEDICINE

## 2023-01-14 PROCEDURE — 99284 EMERGENCY DEPT VISIT MOD MDM: CPT | Mod: 25

## 2023-01-14 PROCEDURE — 82962 GLUCOSE BLOOD TEST: CPT

## 2023-01-14 PROCEDURE — 87086 URINE CULTURE/COLONY COUNT: CPT | Performed by: FAMILY MEDICINE

## 2023-01-14 PROCEDURE — 84484 ASSAY OF TROPONIN QUANT: CPT | Performed by: FAMILY MEDICINE

## 2023-01-14 PROCEDURE — 85025 COMPLETE CBC W/AUTO DIFF WBC: CPT | Performed by: FAMILY MEDICINE

## 2023-01-14 PROCEDURE — 80048 BASIC METABOLIC PNL TOTAL CA: CPT | Performed by: FAMILY MEDICINE

## 2023-01-14 PROCEDURE — 99283 EMERGENCY DEPT VISIT LOW MDM: CPT | Performed by: FAMILY MEDICINE

## 2023-01-14 PROCEDURE — 70450 CT HEAD/BRAIN W/O DYE: CPT

## 2023-01-14 RX ORDER — LISINOPRIL 40 MG/1
40 TABLET ORAL DAILY
COMMUNITY
Start: 2022-11-14

## 2023-01-14 RX ORDER — ACETAMINOPHEN 500 MG
1 TABLET ORAL DAILY
COMMUNITY
Start: 2022-01-14

## 2023-01-14 RX ORDER — NYSTATIN 100000 [USP'U]/G
POWDER TOPICAL 2 TIMES DAILY PRN
COMMUNITY
Start: 2023-01-10

## 2023-01-14 RX ORDER — ESTRADIOL 0.1 MG/G
2 CREAM VAGINAL
COMMUNITY
Start: 2022-11-14

## 2023-01-14 RX ORDER — FLUTICASONE PROPIONATE 50 MCG
2 SPRAY, SUSPENSION (ML) NASAL 2 TIMES DAILY
COMMUNITY
Start: 2022-11-14

## 2023-01-14 RX ORDER — HYDROCHLOROTHIAZIDE 25 MG/1
25 TABLET ORAL
COMMUNITY
Start: 2022-11-17

## 2023-01-14 RX ORDER — SENNOSIDES A AND B 8.6 MG/1
1 TABLET, FILM COATED ORAL AT BEDTIME
COMMUNITY
Start: 2022-11-15

## 2023-01-14 ASSESSMENT — ACTIVITIES OF DAILY LIVING (ADL)
ADLS_ACUITY_SCORE: 35
ADLS_ACUITY_SCORE: 35

## 2023-01-14 NOTE — ED PROVIDER NOTES
"  History     Chief Complaint   Patient presents with     Dizziness     HPI  Rae Yates is a 89 year old female who presents from the local nursing home with concerns of intermittent dizziness.  Patient's had a history of TIAs and family wanted her \"checked out\".  Patient states that after breakfast yesterday she started to feel dizzy and got nauseous and then had to help her back to her room.  She laid down and then after a few minutes started to feel better.  She states this happened again this morning also after breakfast and once again got better after laying down for a while.  She was fine throughout the evening yesterday and has been fine since then.  Currently patient has no symptoms.  She states when she was having this episode she also felt just ill all over.  Denies any recent dysuria but states she gets many bladder infections.  Patient denies a cough or shortness of breath.  Denies any extremity numbness or weakness.  Patient states she has had a little bit of a headache last couple days which she thinks is more of a sinus pressure type of headache.    Allergies:  Allergies   Allergen Reactions     Nitrofurantoin Other (See Comments) and Headache     Macrobid  Other reaction(s): Edema, Flushing  \"Spacey\" \"Funny\" sensation throughout the body/did not tolerate; No Rash.    Macrobid  Macrobid       Prednisone Other (See Comments)     Colon bleeding     Sulfamethoxazole-Trimethoprim Dizziness and Shortness Of Breath     Sulfamethoxazole-Trimethoprim [Sulfamethoxazole W/Trimethoprim] Dizziness and Shortness Of Breath     Cephalexin Nausea     NAUSEA HEADACHE, Keflex  NAUSEA HEADACHE  Keflex  NAUSEA HEADACHE  Keflex  NAUSEA HEADACHE  Keflex       Clindamycin Headache     Bloat  HA  heartburn  Bloat  HA  heartburn  Bloat  HA  heartburn     Clopidogrel Unknown and Diarrhea     PLAVIX  EPISTAAXIS  PLAVIX  EPISTAAXIS    PLAVIX  EPISTAAXIS       Clopidogrel Bisulfate [Clopidogrel] Diarrhea     PLAVIX, " "EPISTAAXIS     Cortisone Other (See Comments)     Other reaction(s): Flushing     Escitalopram Unknown     Other reaction(s): *Unknown     Levofloxacin Nausea     Weak, nausea, diarrhea  Weak, nausea, diarrhea  Weak, nausea, diarrhea     Metronidazole Unknown     Nitrofurantoin Monohyd/M-Cryst [Nitrofurantoin] Unknown     \"Spacey\" \"Funny\" sensation throughout the body/did not tolerate; No Rash.       Nitroimidazoles [Metronidazole] Nausea     FLAGYL, Weak, nausea, diarrhea     Nsaids (Non-Steroidal Anti-Inflammatory Drug) [Nsaids] Dizziness and Nausea     several     Olmesartan Dizziness     BENICAR  BENICAR    BENICAR     Pravastatin Headache and Nausea     Simvastatin Nausea     Weak  Weak    Weak     Rifampin Nausea and Vomiting     With fatigue, loss of appetite       Problem List:    Patient Active Problem List    Diagnosis Date Noted     Cerebral infarction (H) 08/03/2022     Priority: Medium     Formatting of this note might be different from the original.  2007 small temporal lobe on MRI; not there 2006       Anxiety 08/03/2022     Priority: Medium     Formatting of this note might be different from the original.  2010--;family issues cries at times;       Allergic rhinitis 08/03/2022     Priority: Medium     Formatting of this note might be different from the original.  worse spring and fall benadryl       Actinic keratosis 08/03/2022     Priority: Medium     AAA (abdominal aortic aneurysm) 08/03/2022     Priority: Medium     Formatting of this note might be different from the original.  5 sibs have AAAs!  2.8 cm  10-08  Was 2 cm in 1998  No follow up needed any more;  But she wants follow up in 2011. US 10-11 3.2 cm; little change;  Due US  ; saw dr sanchez 7-13 he does not feel that it needs repair and that by the time it may she will be too old. Added metoprolol xl 50 mg for BP and wants LDL < 130;  CT scan done at [facility] 6-10-14 3.0 cm up from 2.6 cm previous ct scan 2006 " comparison.  8-17-15 2.9 cm on ct scan.  3-17-17 3.2 cm infrarenal aaa. It was 3.0 cm in August, 2016.   10-13-19 3.6 cm infrarenal aaa. [facility].  10-29-21 3.4 cm infrarenal aaa at [facility]. 2.1 cm aneurysm at the iliac bifurcation.       DJD (degenerative joint disease), lumbar 08/03/2022     Priority: Medium     Formatting of this note might be different from the original.  LBP chronic uses norco and Tyl       Female bladder prolapse, acquired 08/03/2022     Priority: Medium     Formatting of this note might be different from the original.  per urol 7-10 will try pessary did not work       Macular degeneration 08/03/2022     Priority: Medium     Formatting of this note might be different from the original.  On avastin injections 2013       Obesity 08/03/2022     Priority: Medium     Formatting of this note might be different from the original.  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  + PPD       Prediabetes 08/03/2022     Priority: Medium     Formatting of this note might be different from the original.  103  9-10; 101  9-11; = 99   9-13;       Pure hypercholesterolemia 08/03/2022     Priority: Medium     Formatting of this note might be different from the original.  no meds  saw dr sanchez 7-13 he does not feel that it needs repair and that by the time it may she will be too old. Added metoprolol xl 50 mg for BP and wants LDL < 130; intolerant to simva and prava  No use to try more meds;       Sialadenitis 08/03/2022     Priority: Medium     Formatting of this note might be different from the original.  CT 11-11; Left submandibular gland; No Sx    Formatting of this note might be different from the original.  Sialadenitis 6-11 Rx antibiotic; improved but developed L parotid mass  Sent to ENT  12-11;       TIA (transient ischemic attack) 08/03/2022     Priority: Medium     Formatting of this note might be  different from the original.  aphasia and facial droop right  car. US neg. Aneurism b l ica  since 2005  Followed by [facility]ology; MRA 2010;   MRA NC  8-12;       TMJPDS (temporomandibular joint pain dysfunction syndrome) 08/03/2022     Priority: Medium     Formatting of this note might be different from the original.  left mild pain on and off       Mild cognitive impairment with memory loss 04/22/2022     Priority: Medium     Formatting of this note might be different from the original.  3-8-22 neurology consult with [facility]. MMSE 26/30.       B12 deficiency 02/23/2022     Priority: Medium     Prolapsed urethral mucosa 04/13/2021     Priority: Medium     Created by Conversion      Formatting of this note might be different from the original.  Formatting of this note might be different from the original.  Created by Conversion       Postmenopausal atrophic vaginitis 04/13/2021     Priority: Medium     Created by Conversion      Formatting of this note might be different from the original.  Formatting of this note might be different from the original.  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  10-31-19 CT scan. Partially thrombosed. Stable. [facility].       CKD (chronic kidney disease) stage 3, GFR 30-59 ml/min (H) 11/09/2018     Priority: Medium     Edema of left lower leg due to peripheral venous insufficiency 07/25/2018     Priority: Medium     Multiple allergies      Priority: Medium     Diverticulitis 09/21/2017     Priority: Medium     Formatting of this note might be different from the original.  SINCE THE 1980s several episodes had  2008 and 3-11;  CAN NOT TOLERATE FLAGYL; or clindamycin; cipro OK; another episode 12-21-11 Rx cipro. X 7 days;  LLQ pain 7-12 Rx cirpo for 7 days; another episode 3-13 Rx cipro;   Recurrence 5-17-13 Rx cipro for a week 5-20'; another  episode Rx cipro 7-13;       Leukocytosis, unspecified type 09/21/2017     Priority: Medium     Hyponatremia 09/21/2017     Priority: Medium     SEGUNDO (acute kidney injury) (H) 09/21/2017     Priority: Medium     Hypertension      Priority: Medium     Created by Conversion  Replacement Utility updated for latest IMO load      Formatting of this note might be different from the original.  Created by Conversion    Replacement Utility updated for latest IMO load  Formatting of this note might be different from the original.  On lisinopril 10 and hctz  25   BP  170/75 (has diverticulitis 3-13)  saw dr sanchez 7-13 he does not feel that it needs repair and that by the time it may she will be too old. Added metoprolol xl 50 mg for BP and wants LDL < 130;  105   9-13; I stopped the metoprolol 9-13;       Diverticulosis      Priority: Medium     Created by Conversion  Replacement Utility updated for latest IMO load      Formatting of this note might be different from the original.  Created by Conversion    Replacement Utility updated for latest IMO load    Formatting of this note might be different from the original.  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  8-17-15 3 mm. Stable dating back to 2010.       Emphysema of lung (H) 02/06/2015     Priority: Medium     Formatting of this note might be different from the original.  2-4-15 moderate on ct scan. Fibrosis in both apices.       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  CT scan 1-29-14 [facility] C4-5-6. No cord compression.    Formatting of this note might be different from the original.  2006 Left ICA followed by dr centeno sp rad  MRA NC  8-12;  8-17-15 left paraophthalmic 3.5 mm aneurysm stable dating back to 2010.       Pulmonary nodule  02/02/2015     Priority: Medium     Formatting of this note might be different from the original.  2-4-15 bilateral, less than 4 mm. Re scan in one year.       Joint Pain, Localized In The Hip      Priority: Medium     Created by Conversion         Sciatica of left side 12/04/2013     Priority: Medium     Formatting of this note might be different from the original.  Sciatica of left side  11-13 + possible let weakness; flexeril is helping 12-10-13;       DJD (degenerative joint disease) of knee 09/19/2012     Priority: Medium     Formatting of this note might be different from the original.  hands and knees and back 2nd series of synvisc 8-11 helps    Formatting of this note might be different from the original.    2nd series of synvisc 8-11 helps   supartz injection done 9-12  Help ;  Had supartz injection right knee due to inc. Pain 3-13; another 10-13;  Leg swelled after that does not want any more injections.       Plantar fasciitis 04/09/2012     Priority: Medium     Formatting of this note might be different from the original.  left lat neck Sx due to injury    Formatting of this note might be different from the original.  Plantar fasciitis 3-12;  Gave in for and rec insert 4-12; mild 9-13 uses gel pads       Chronic pain 09/13/2011     Priority: Medium     Formatting of this note might be different from the original.  KNEES AND HIPS AND BACK ON TERI 5 MG 2-3 A DAY CSA 9-11       FHx: AAA 11/01/1995     Priority: Medium     Formatting of this note might be different from the original.  5 siblings          Past Medical History:    History reviewed. No pertinent past medical history.    Past Surgical History:    Past Surgical History:   Procedure Laterality Date     HYSTERECTOMY TOTAL ABDOMINAL, BILATERAL SALPINGO-OOPHORECTOMY, COMBINED  1974     LAPAROSCOPIC CHOLECYSTECTOMY  2000     TOTAL HIP ARTHROPLASTY Right 2007     [facility] TOTAL HIP ARTHROPLASTY      Description: Total Hip Replacement;  Recorded: 10/15/2009;        Family History:    History reviewed. No pertinent family history.    Social History:  Marital Status:   [2]  Social History     Tobacco Use     Smoking status: Former     Types: Cigarettes     Quit date: 7/15/1976     Years since quittin.5     Smokeless tobacco: Never   Substance Use Topics     Alcohol use: No     Drug use: No        Medications:    acetaminophen (TYLENOL) 500 MG tablet  aspirin (ASA) 325 MG EC tablet  cholecalciferol (VITAMIN D3) 25 mcg (1000 units) capsule  cyanocobalamin (VITAMIN B-12) 500 MCG tablet  estradiol (ESTRACE) 0.1 MG/GM vaginal cream  fluticasone (FLONASE) 50 MCG/ACT nasal spray  gabapentin (NEURONTIN) 300 MG capsule  hydrochlorothiazide (HYDRODIURIL) 25 MG tablet  lisinopril (ZESTRIL) 40 MG tablet  magnesium oxide 200 MG TABS  Multiple Vitamins-Minerals (PRESERVISION AREDS 2) CAPS  nystatin (MYCOSTATIN) 630993 UNIT/GM external powder  polyvinyl alcohol-povidone PF (REFRESH) 1.4-0.6 % ophthalmic solution  Probiotic, Lactobacillus, CAPS  senna (SENOKOT) 8.6 MG tablet  furosemide (LASIX) 40 MG tablet  hydrocortisone (CORTAID) 1 % external cream  lisinopril (PRINIVIL,ZESTRIL) 10 MG tablet  Menthol-Methyl Salicylate (ICY HOT EXTRA STRENGTH) 10-30 % CREA  potassium chloride (KLOR-CON) 20 MEQ packet          Review of Systems   All other systems reviewed and are negative.      Physical Exam   BP: (!) 179/77  Pulse: 74  Resp: 18  Weight: 79.4 kg (175 lb)  SpO2: 98 %      Physical Exam  Vitals and nursing note reviewed.   Constitutional:       General: She is not in acute distress.     Appearance: She is well-developed. She is not diaphoretic.   Eyes:      Conjunctiva/sclera: Conjunctivae normal.   Cardiovascular:      Rate and Rhythm: Normal rate and regular rhythm.      Heart sounds: Normal heart sounds. No murmur heard.    No friction rub. No gallop.   Pulmonary:      Effort: Pulmonary effort is normal. No respiratory distress.      Breath sounds: Normal breath sounds. No  wheezing or rales.   Chest:      Chest wall: No tenderness.   Abdominal:      General: Bowel sounds are normal. There is no distension.      Palpations: Abdomen is soft. There is no mass.      Tenderness: There is no abdominal tenderness. There is no guarding.   Musculoskeletal:         General: No tenderness. Normal range of motion.      Cervical back: Normal range of motion and neck supple.   Skin:     General: Skin is warm and dry.      Findings: No rash.   Neurological:      Mental Status: She is alert and oriented to person, place, and time.   Psychiatric:         Judgment: Judgment normal.         ED Course                 Procedures        Results for orders placed or performed during the hospital encounter of 01/14/23 (from the past 24 hour(s))   Glucose by meter   Result Value Ref Range    GLUCOSE BY METER POCT 111 (H) 70 - 99 mg/dL   CBC with platelets differential    Narrative    The following orders were created for panel order CBC with platelets differential.  Procedure                               Abnormality         Status                     ---------                               -----------         ------                     CBC with platelets and d...[[id number]]                      Final result                 Please view results for these tests on the individual orders.   Basic metabolic panel   Result Value Ref Range    Sodium 138 133 - 144 mmol/L    Potassium 4.1 3.4 - 5.3 mmol/L    Chloride 104 94 - 109 mmol/L    Carbon Dioxide (CO2) 31 20 - 32 mmol/L    Anion Gap 3 3 - 14 mmol/L    Urea Nitrogen 24 7 - 30 mg/dL    Creatinine 1.10 (H) 0.52 - 1.04 mg/dL    Calcium 8.7 8.5 - 10.1 mg/dL    Glucose 111 (H) 70 - 99 mg/dL    GFR Estimate 47 (L) >60 mL/min/1.73m2   Troponin I   Result Value Ref Range    Troponin I High Sensitivity 8 <54 ng/L   CBC with platelets and differential   Result Value Ref Range    WBC Count 10.5 4.0 - 11.0 10e3/uL    RBC Count 3.85 3.80 - 5.20 10e6/uL    Hemoglobin 11.7  11.7 - 15.7 g/dL    Hematocrit 36.2 35.0 - 47.0 %    MCV 94 78 - 100 fL    MCH 30.4 26.5 - 33.0 pg    MCHC 32.3 31.5 - 36.5 g/dL    RDW 13.7 10.0 - 15.0 %    Platelet Count 247 150 - 450 10e3/uL    % Neutrophils 75 %    % Lymphocytes 13 %    % Monocytes 8 %    % Eosinophils 2 %    % Basophils 1 %    % Immature Granulocytes 1 %    NRBCs per 100 WBC 0 <1 /100    Absolute Neutrophils 8.0 1.6 - 8.3 10e3/uL    Absolute Lymphocytes 1.4 0.8 - 5.3 10e3/uL    Absolute Monocytes 0.9 0.0 - 1.3 10e3/uL    Absolute Eosinophils 0.2 0.0 - 0.7 10e3/uL    Absolute Basophils 0.1 0.0 - 0.2 10e3/uL    Absolute Immature Granulocytes 0.1 <=0.4 10e3/uL    Absolute NRBCs 0.0 10e3/uL   Head CT w/o contrast    Narrative    EXAM: CT HEAD W/O CONTRAST  LOCATION: [facility]  DATE/TIME: 1/14/2023 5:06 PM    INDICATION: dizziness  COMPARISON: 07/20/2022  TECHNIQUE: Routine CT Head without IV contrast. Multiplanar reformats. Dose reduction techniques were used.    FINDINGS:  INTRACRANIAL CONTENTS: No intracranial hemorrhage, extraaxial collection, or mass effect.  There are scattered low-attenuation lesions throughout the hemispheric white matter compatible with chronic small vessel ischemic change. Unchanged   encephalomalacia of the posterior lateral right temporal lobe. There is an area of loss of gray-white differentiation in the inferior left parietal lobe which is increased in conspicuity from the prior exam. Unsure if this is technical basis or if this   represents interval progressive ischemic change. Moderate generalized volume loss. No hydrocephalus.     VISUALIZED ORBITS/SINUSES/MASTOIDS: No intraorbital abnormality. No paranasal sinus mucosal disease. No middle ear or mastoid effusion.    BONES/SOFT TISSUES: No acute abnormality.      Impression    IMPRESSION:  1.  No acute intracranial hemorrhage, extra axial collection, or midline shift.  2.  Mild to moderate sequela of chronic small vessel ischemic  disease. Increased conspicuity of a region of loss of gray-white differentiation in the inferior left parietal lobe may be on a technical basis or represent interval progressive ischemic   change. If there is concern for acute ischemia, an MRI could further evaluate.  3.  Unchanged encephalomalacia of the right temporal lobe.  4.  Moderate brain parenchymal volume loss.   UA with Microscopic reflex to Culture    Specimen: Urine, Clean Catch   Result Value Ref Range    Color Urine Yellow Colorless, Straw, Light Yellow, Yellow    Appearance Urine Clear Clear    Glucose Urine Negative Negative mg/dL    Bilirubin Urine Negative Negative    Ketones Urine Negative Negative mg/dL    Specific Gravity Urine 1.015 1.003 - 1.035    Blood Urine Negative Negative    pH Urine 6.5 5.0 - 7.0    Protein Albumin Urine Negative Negative mg/dL    Urobilinogen Urine Normal Normal, 2.0 mg/dL    Nitrite Urine Negative Negative    Leukocyte Esterase Urine Small (A) Negative    RBC Urine 2 <=2 /HPF    WBC Urine 13 (H) <=5 /HPF    Squamous Epithelials Urine 7 (H) <=1 /HPF    Hyaline Casts Urine 1 <=2 /LPF    Narrative    Urine Culture ordered based on laboratory criteria   Extra Tube    Narrative    The following orders were created for panel order Extra Tube.  Procedure                               Abnormality         Status                     ---------                               -----------         ------                     Extra Blue Top Tube[[id number]]                                                           Please view results for these tests on the individual orders.       Medications - No data to display     Labs have come back and are mostly unremarkable.  Patient only had a few white cells in her urine but she is not having any signs of a UTI, I am not going to treat her but I will send this off for culture though.  CT scan just shows mostly old chronic changes.  I do not think this is likely a stroke and TIAs little bit less  likely as it seems of come on both times after breakfast only and then resolves on its own.  I am not sure if it something related to eating like if there is some change in her blood sugar or if this could be more of a paroxysmal vertigo type of issue.  At this point I think we ruled out any emergent medical condition she feels well.  We did a road test here and patient is ambulating well.  Would recommend very close follow-up with her doctor as an outpatient for further evaluation especially if the symptoms recur.  Patient was told to return if her symptoms change or persist.    Assessments & Plan (with Medical Decision Making)  Vertigo     I have reviewed the nursing notes.    I have reviewed the findings, diagnosis, plan and need for follow up with the patient.              1/14/2023   [facility] EMERGENCY DEPT     Mathieu Kuhn MD  01/14/23 [id number]

## 2023-01-14 NOTE — ED TRIAGE NOTES
Pt comes from [facility] in [place] via ambulance.   Pt started to have vision changes yesterday, swelling to side of face.   Pt feels that she feels dizzy and nausea after eating.   Has history of TIA, carotid and abdominal aneurism.   Pt is alert and oriented.  Denies SOB and chest pain.  PT is DNR. Came with paperwork from NH.     Triage Assessment       Row Name 01/14/23 0532       Triage Assessment (Adult)    Airway WDL WDL       Respiratory WDL    Respiratory WDL WDL       Cognitive/Neuro/Behavioral WDL    Cognitive/Neuro/Behavioral WDL X  dizzy earlier, not currently       Belle Coma Scale    Best Eye Response 4-->(E4) spontaneous    Best Motor Response 6-->(M6) obeys commands    Best Verbal Response 5-->(V5) oriented    Belle Coma Scale Score 15

## 2023-01-14 NOTE — ED NOTES
Walked with pt and walker down the hallway and back. Was able to complete without difficulty. MD updated.

## 2023-01-16 LAB — BACTERIA UR CULT: NORMAL

## 2023-01-16 NOTE — RESULT ENCOUNTER NOTE
Final urine culture report is negative.  Adult Negative Urine culture parameters per protocol: Any # Urogenital single or mixed organism, <10,000 col/ml single organism (cath/midstream), and > 3 organisms (No susceptibilities performed).  [facility] Emergency Dept discharge antibiotic prescribed (If applicable): None  Treatment recommendations per [facility] ED Lab Result Urine Culture protocol.

## 2023-02-14 ENCOUNTER — LAB REQUISITION (OUTPATIENT)
Dept: LAB | Facility: CLINIC | Age: 88
End: 2023-02-14
Payer: COMMERCIAL

## 2023-02-14 DIAGNOSIS — E63.9 NUTRITIONAL DEFICIENCY, UNSPECIFIED: ICD-10-CM

## 2023-02-14 LAB — VIT B12 SERPL-MCNC: 892 PG/ML (ref 232–1245)

## 2023-02-14 PROCEDURE — 82607 VITAMIN B-12: CPT | Mod: ORL | Performed by: NURSE PRACTITIONER

## 2023-02-14 PROCEDURE — P9603 ONE-WAY ALLOW PRORATED MILES: HCPCS | Mod: ORL | Performed by: NURSE PRACTITIONER

## 2023-02-14 PROCEDURE — 36415 COLL VENOUS BLD VENIPUNCTURE: CPT | Mod: ORL | Performed by: NURSE PRACTITIONER

## 2023-03-29 ENCOUNTER — LAB REQUISITION (OUTPATIENT)
Dept: LAB | Facility: CLINIC | Age: 88
End: 2023-03-29
Payer: COMMERCIAL

## 2023-03-29 DIAGNOSIS — R60.0 LOCALIZED EDEMA: ICD-10-CM

## 2023-03-29 DIAGNOSIS — E87.1 HYPO-OSMOLALITY AND HYPONATREMIA: ICD-10-CM

## 2023-03-30 LAB
ANION GAP SERPL CALCULATED.3IONS-SCNC: 9 MMOL/L (ref 7–15)
BUN SERPL-MCNC: 27.7 MG/DL (ref 8–23)
CALCIUM SERPL-MCNC: 9.7 MG/DL (ref 8.2–9.6)
CHLORIDE SERPL-SCNC: 101 MMOL/L (ref 98–107)
CREAT SERPL-MCNC: 1.15 MG/DL (ref 0.51–0.95)
DEPRECATED HCO3 PLAS-SCNC: 29 MMOL/L (ref 22–29)
GFR SERPL CREATININE-BSD FRML MDRD: 44 ML/MIN/1.73M2
GLUCOSE SERPL-MCNC: 92 MG/DL (ref 70–99)
POTASSIUM SERPL-SCNC: 4.3 MMOL/L (ref 3.4–5.3)
SODIUM SERPL-SCNC: 139 MMOL/L (ref 136–145)

## 2023-03-30 PROCEDURE — P9603 ONE-WAY ALLOW PRORATED MILES: HCPCS | Mod: ORL | Performed by: FAMILY MEDICINE

## 2023-03-30 PROCEDURE — 36415 COLL VENOUS BLD VENIPUNCTURE: CPT | Mod: ORL | Performed by: FAMILY MEDICINE

## 2023-03-30 PROCEDURE — 80048 BASIC METABOLIC PNL TOTAL CA: CPT | Mod: ORL | Performed by: FAMILY MEDICINE

## 2023-05-31 ENCOUNTER — LAB REQUISITION (OUTPATIENT)
Dept: LAB | Facility: CLINIC | Age: 88
End: 2023-05-31
Payer: COMMERCIAL

## 2023-05-31 DIAGNOSIS — E53.8 DEFICIENCY OF OTHER SPECIFIED B GROUP VITAMINS: ICD-10-CM

## 2023-06-01 LAB — VIT B12 SERPL-MCNC: 713 PG/ML (ref 232–1245)

## 2023-06-01 PROCEDURE — 82607 VITAMIN B-12: CPT | Mod: ORL | Performed by: NURSE PRACTITIONER

## 2023-06-01 PROCEDURE — 36415 COLL VENOUS BLD VENIPUNCTURE: CPT | Mod: ORL | Performed by: NURSE PRACTITIONER

## 2023-06-07 PROCEDURE — 87086 URINE CULTURE/COLONY COUNT: CPT | Mod: ORL | Performed by: NURSE PRACTITIONER

## 2023-06-08 ENCOUNTER — LAB REQUISITION (OUTPATIENT)
Dept: LAB | Facility: CLINIC | Age: 88
End: 2023-06-08
Payer: COMMERCIAL

## 2023-06-08 DIAGNOSIS — R30.9 PAINFUL MICTURITION, UNSPECIFIED: ICD-10-CM

## 2023-06-09 LAB — BACTERIA UR CULT: ABNORMAL

## 2023-08-16 ENCOUNTER — LAB REQUISITION (OUTPATIENT)
Dept: LAB | Facility: CLINIC | Age: 88
End: 2023-08-16
Payer: COMMERCIAL

## 2023-08-16 DIAGNOSIS — Z86.73 PERSONAL HISTORY OF TRANSIENT ISCHEMIC ATTACK (TIA), AND CEREBRAL INFARCTION WITHOUT RESIDUAL DEFICITS: ICD-10-CM

## 2023-08-17 LAB — HGB BLD-MCNC: 11.2 G/DL (ref 11.7–15.7)

## 2023-08-17 PROCEDURE — P9603 ONE-WAY ALLOW PRORATED MILES: HCPCS | Mod: ORL | Performed by: NURSE PRACTITIONER

## 2023-08-17 PROCEDURE — 36415 COLL VENOUS BLD VENIPUNCTURE: CPT | Mod: ORL | Performed by: NURSE PRACTITIONER

## 2023-08-17 PROCEDURE — 85018 HEMOGLOBIN: CPT | Mod: ORL | Performed by: NURSE PRACTITIONER

## 2023-12-04 ENCOUNTER — LAB REQUISITION (OUTPATIENT)
Dept: LAB | Facility: CLINIC | Age: 88
End: 2023-12-04
Payer: COMMERCIAL

## 2023-12-04 DIAGNOSIS — R60.0 LOCALIZED EDEMA: ICD-10-CM

## 2023-12-05 LAB
ANION GAP SERPL CALCULATED.3IONS-SCNC: 13 MMOL/L (ref 7–15)
BUN SERPL-MCNC: 30.3 MG/DL (ref 8–23)
CALCIUM SERPL-MCNC: 9.2 MG/DL (ref 8.2–9.6)
CHLORIDE SERPL-SCNC: 101 MMOL/L (ref 98–107)
CREAT SERPL-MCNC: 1.17 MG/DL (ref 0.51–0.95)
DEPRECATED HCO3 PLAS-SCNC: 24 MMOL/L (ref 22–29)
EGFRCR SERPLBLD CKD-EPI 2021: 44 ML/MIN/1.73M2
GLUCOSE SERPL-MCNC: 101 MG/DL (ref 70–99)
POTASSIUM SERPL-SCNC: 4.4 MMOL/L (ref 3.4–5.3)
SODIUM SERPL-SCNC: 138 MMOL/L (ref 135–145)

## 2023-12-05 PROCEDURE — 80048 BASIC METABOLIC PNL TOTAL CA: CPT | Mod: ORL | Performed by: NURSE PRACTITIONER

## 2023-12-05 PROCEDURE — P9603 ONE-WAY ALLOW PRORATED MILES: HCPCS | Mod: ORL | Performed by: NURSE PRACTITIONER

## 2023-12-05 PROCEDURE — 36415 COLL VENOUS BLD VENIPUNCTURE: CPT | Mod: ORL | Performed by: NURSE PRACTITIONER

## 2023-12-21 ENCOUNTER — LAB REQUISITION (OUTPATIENT)
Dept: LAB | Facility: CLINIC | Age: 88
End: 2023-12-21
Payer: COMMERCIAL

## 2023-12-21 DIAGNOSIS — R30.0 DYSURIA: ICD-10-CM

## 2023-12-21 PROCEDURE — 87086 URINE CULTURE/COLONY COUNT: CPT | Mod: ORL | Performed by: NURSE PRACTITIONER

## 2023-12-22 LAB — BACTERIA UR CULT: NORMAL

## 2024-03-20 ENCOUNTER — LAB REQUISITION (OUTPATIENT)
Dept: LAB | Facility: CLINIC | Age: 89
End: 2024-03-20
Payer: COMMERCIAL

## 2024-03-20 DIAGNOSIS — I12.9 HYPERTENSIVE CHRONIC KIDNEY DISEASE WITH STAGE 1 THROUGH STAGE 4 CHRONIC KIDNEY DISEASE, OR UNSPECIFIED CHRONIC KIDNEY DISEASE: ICD-10-CM

## 2024-03-21 LAB
ANION GAP SERPL CALCULATED.3IONS-SCNC: 10 MMOL/L (ref 7–15)
BUN SERPL-MCNC: 22 MG/DL (ref 8–23)
CALCIUM SERPL-MCNC: 9.5 MG/DL (ref 8.2–9.6)
CHLORIDE SERPL-SCNC: 100 MMOL/L (ref 98–107)
CREAT SERPL-MCNC: 1.07 MG/DL (ref 0.51–0.95)
DEPRECATED HCO3 PLAS-SCNC: 27 MMOL/L (ref 22–29)
EGFRCR SERPLBLD CKD-EPI 2021: 48 ML/MIN/1.73M2
GLUCOSE SERPL-MCNC: 95 MG/DL (ref 70–99)
POTASSIUM SERPL-SCNC: 4.4 MMOL/L (ref 3.4–5.3)
SODIUM SERPL-SCNC: 137 MMOL/L (ref 135–145)

## 2024-03-21 PROCEDURE — 80048 BASIC METABOLIC PNL TOTAL CA: CPT | Mod: ORL | Performed by: NURSE PRACTITIONER

## 2024-03-21 PROCEDURE — P9603 ONE-WAY ALLOW PRORATED MILES: HCPCS | Mod: ORL | Performed by: NURSE PRACTITIONER

## 2024-03-21 PROCEDURE — 36415 COLL VENOUS BLD VENIPUNCTURE: CPT | Mod: ORL | Performed by: NURSE PRACTITIONER

## 2024-03-26 LAB — BACTERIA BLD CULT: NO GROWTH

## 2024-04-24 ENCOUNTER — LAB REQUISITION (OUTPATIENT)
Dept: LAB | Facility: CLINIC | Age: 89
End: 2024-04-24
Payer: COMMERCIAL

## 2024-04-24 DIAGNOSIS — I12.9 HYPERTENSIVE CHRONIC KIDNEY DISEASE WITH STAGE 1 THROUGH STAGE 4 CHRONIC KIDNEY DISEASE, OR UNSPECIFIED CHRONIC KIDNEY DISEASE: ICD-10-CM

## 2024-04-25 LAB
ANION GAP SERPL CALCULATED.3IONS-SCNC: 11 MMOL/L (ref 7–15)
BUN SERPL-MCNC: 24.8 MG/DL (ref 8–23)
CALCIUM SERPL-MCNC: 9.3 MG/DL (ref 8.2–9.6)
CHLORIDE SERPL-SCNC: 105 MMOL/L (ref 98–107)
CREAT SERPL-MCNC: 1.14 MG/DL (ref 0.51–0.95)
DEPRECATED HCO3 PLAS-SCNC: 25 MMOL/L (ref 22–29)
EGFRCR SERPLBLD CKD-EPI 2021: 45 ML/MIN/1.73M2
GLUCOSE SERPL-MCNC: 95 MG/DL (ref 70–99)
POTASSIUM SERPL-SCNC: 4.4 MMOL/L (ref 3.4–5.3)
SODIUM SERPL-SCNC: 141 MMOL/L (ref 135–145)

## 2024-04-25 PROCEDURE — P9603 ONE-WAY ALLOW PRORATED MILES: HCPCS | Mod: ORL | Performed by: NURSE PRACTITIONER

## 2024-04-25 PROCEDURE — 36415 COLL VENOUS BLD VENIPUNCTURE: CPT | Mod: ORL | Performed by: NURSE PRACTITIONER

## 2024-04-25 PROCEDURE — 80048 BASIC METABOLIC PNL TOTAL CA: CPT | Mod: ORL | Performed by: NURSE PRACTITIONER

## 2024-05-15 ENCOUNTER — LAB REQUISITION (OUTPATIENT)
Dept: LAB | Facility: CLINIC | Age: 89
End: 2024-05-15
Payer: COMMERCIAL

## 2024-05-15 DIAGNOSIS — R60.0 LOCALIZED EDEMA: ICD-10-CM

## 2024-05-16 LAB
ANION GAP SERPL CALCULATED.3IONS-SCNC: 14 MMOL/L (ref 7–15)
BUN SERPL-MCNC: 39.9 MG/DL (ref 8–23)
CALCIUM SERPL-MCNC: 9.4 MG/DL (ref 8.2–9.6)
CHLORIDE SERPL-SCNC: 98 MMOL/L (ref 98–107)
CREAT SERPL-MCNC: 1.72 MG/DL (ref 0.51–0.95)
DEPRECATED HCO3 PLAS-SCNC: 27 MMOL/L (ref 22–29)
EGFRCR SERPLBLD CKD-EPI 2021: 27 ML/MIN/1.73M2
GLUCOSE SERPL-MCNC: 91 MG/DL (ref 70–99)
HGB BLD-MCNC: 11.3 G/DL (ref 11.7–15.7)
POTASSIUM SERPL-SCNC: 5.4 MMOL/L (ref 3.4–5.3)
SODIUM SERPL-SCNC: 139 MMOL/L (ref 135–145)

## 2024-05-16 PROCEDURE — 36415 COLL VENOUS BLD VENIPUNCTURE: CPT | Mod: ORL | Performed by: NURSE PRACTITIONER

## 2024-05-16 PROCEDURE — 85018 HEMOGLOBIN: CPT | Mod: ORL | Performed by: NURSE PRACTITIONER

## 2024-05-16 PROCEDURE — 80048 BASIC METABOLIC PNL TOTAL CA: CPT | Mod: ORL | Performed by: NURSE PRACTITIONER

## 2024-05-16 PROCEDURE — P9603 ONE-WAY ALLOW PRORATED MILES: HCPCS | Mod: ORL | Performed by: NURSE PRACTITIONER

## 2024-05-22 ENCOUNTER — LAB REQUISITION (OUTPATIENT)
Dept: LAB | Facility: CLINIC | Age: 89
End: 2024-05-22
Payer: COMMERCIAL

## 2024-05-22 DIAGNOSIS — I50.33 ACUTE ON CHRONIC DIASTOLIC (CONGESTIVE) HEART FAILURE (H): ICD-10-CM

## 2024-05-23 LAB
ANION GAP SERPL CALCULATED.3IONS-SCNC: 12 MMOL/L (ref 7–15)
BUN SERPL-MCNC: 62.5 MG/DL (ref 8–23)
CALCIUM SERPL-MCNC: 9.6 MG/DL (ref 8.2–9.6)
CHLORIDE SERPL-SCNC: 101 MMOL/L (ref 98–107)
CREAT SERPL-MCNC: 1.83 MG/DL (ref 0.51–0.95)
DEPRECATED HCO3 PLAS-SCNC: 30 MMOL/L (ref 22–29)
EGFRCR SERPLBLD CKD-EPI 2021: 25 ML/MIN/1.73M2
GLUCOSE SERPL-MCNC: 97 MG/DL (ref 70–99)
POTASSIUM SERPL-SCNC: 5 MMOL/L (ref 3.4–5.3)
SODIUM SERPL-SCNC: 143 MMOL/L (ref 135–145)

## 2024-05-23 PROCEDURE — 80048 BASIC METABOLIC PNL TOTAL CA: CPT | Mod: ORL | Performed by: NURSE PRACTITIONER

## 2024-05-23 PROCEDURE — P9603 ONE-WAY ALLOW PRORATED MILES: HCPCS | Mod: ORL | Performed by: NURSE PRACTITIONER

## 2024-05-23 PROCEDURE — 36415 COLL VENOUS BLD VENIPUNCTURE: CPT | Mod: ORL | Performed by: NURSE PRACTITIONER

## 2024-05-29 ENCOUNTER — LAB REQUISITION (OUTPATIENT)
Dept: LAB | Facility: CLINIC | Age: 89
End: 2024-05-29
Payer: COMMERCIAL

## 2024-05-29 DIAGNOSIS — N18.32 CHRONIC KIDNEY DISEASE, STAGE 3B (H): ICD-10-CM

## 2024-05-30 LAB
ANION GAP SERPL CALCULATED.3IONS-SCNC: 13 MMOL/L (ref 7–15)
BUN SERPL-MCNC: 66.6 MG/DL (ref 8–23)
CALCIUM SERPL-MCNC: 9.4 MG/DL (ref 8.2–9.6)
CHLORIDE SERPL-SCNC: 97 MMOL/L (ref 98–107)
CREAT SERPL-MCNC: 2.06 MG/DL (ref 0.51–0.95)
DEPRECATED HCO3 PLAS-SCNC: 29 MMOL/L (ref 22–29)
EGFRCR SERPLBLD CKD-EPI 2021: 22 ML/MIN/1.73M2
GLUCOSE SERPL-MCNC: 94 MG/DL (ref 70–99)
POTASSIUM SERPL-SCNC: 4.7 MMOL/L (ref 3.4–5.3)
SODIUM SERPL-SCNC: 139 MMOL/L (ref 135–145)

## 2024-05-30 PROCEDURE — P9603 ONE-WAY ALLOW PRORATED MILES: HCPCS | Mod: ORL | Performed by: NURSE PRACTITIONER

## 2024-05-30 PROCEDURE — 80048 BASIC METABOLIC PNL TOTAL CA: CPT | Mod: ORL | Performed by: NURSE PRACTITIONER

## 2024-05-30 PROCEDURE — 36415 COLL VENOUS BLD VENIPUNCTURE: CPT | Mod: ORL | Performed by: NURSE PRACTITIONER

## 2024-06-26 ENCOUNTER — LAB REQUISITION (OUTPATIENT)
Dept: LAB | Facility: CLINIC | Age: 89
End: 2024-06-26
Payer: COMMERCIAL

## 2024-06-26 DIAGNOSIS — N18.32 CHRONIC KIDNEY DISEASE, STAGE 3B (H): ICD-10-CM

## 2024-06-27 LAB
ANION GAP SERPL CALCULATED.3IONS-SCNC: 11 MMOL/L (ref 7–15)
BUN SERPL-MCNC: 35.2 MG/DL (ref 8–23)
CALCIUM SERPL-MCNC: 9.2 MG/DL (ref 8.2–9.6)
CHLORIDE SERPL-SCNC: 100 MMOL/L (ref 98–107)
CREAT SERPL-MCNC: 1.49 MG/DL (ref 0.51–0.95)
DEPRECATED HCO3 PLAS-SCNC: 27 MMOL/L (ref 22–29)
EGFRCR SERPLBLD CKD-EPI 2021: 32 ML/MIN/1.73M2
GLUCOSE SERPL-MCNC: 103 MG/DL (ref 70–99)
POTASSIUM SERPL-SCNC: 4.3 MMOL/L (ref 3.4–5.3)
SODIUM SERPL-SCNC: 138 MMOL/L (ref 135–145)

## 2024-06-27 PROCEDURE — 80048 BASIC METABOLIC PNL TOTAL CA: CPT | Mod: ORL | Performed by: NURSE PRACTITIONER

## 2024-06-27 PROCEDURE — P9603 ONE-WAY ALLOW PRORATED MILES: HCPCS | Mod: ORL | Performed by: NURSE PRACTITIONER

## 2024-06-27 PROCEDURE — 36415 COLL VENOUS BLD VENIPUNCTURE: CPT | Mod: ORL | Performed by: NURSE PRACTITIONER

## 2024-07-31 ENCOUNTER — LAB REQUISITION (OUTPATIENT)
Dept: LAB | Facility: CLINIC | Age: 89
End: 2024-07-31
Payer: COMMERCIAL

## 2024-07-31 DIAGNOSIS — F29 UNSPECIFIED PSYCHOSIS NOT DUE TO A SUBSTANCE OR KNOWN PHYSIOLOGICAL CONDITION (H): ICD-10-CM

## 2024-08-01 LAB
BASOPHILS # BLD AUTO: 0.1 10E3/UL (ref 0–0.2)
BASOPHILS NFR BLD AUTO: 1 %
EOSINOPHIL # BLD AUTO: 0.6 10E3/UL (ref 0–0.7)
EOSINOPHIL NFR BLD AUTO: 9 %
ERYTHROCYTE [DISTWIDTH] IN BLOOD BY AUTOMATED COUNT: 14.4 % (ref 10–15)
HCT VFR BLD AUTO: 35.9 % (ref 35–47)
HGB BLD-MCNC: 11.3 G/DL (ref 11.7–15.7)
IMM GRANULOCYTES # BLD: 0 10E3/UL
IMM GRANULOCYTES NFR BLD: 1 %
LYMPHOCYTES # BLD AUTO: 1.4 10E3/UL (ref 0.8–5.3)
LYMPHOCYTES NFR BLD AUTO: 21 %
MCH RBC QN AUTO: 30.7 PG (ref 26.5–33)
MCHC RBC AUTO-ENTMCNC: 31.5 G/DL (ref 31.5–36.5)
MCV RBC AUTO: 98 FL (ref 78–100)
MONOCYTES # BLD AUTO: 0.6 10E3/UL (ref 0–1.3)
MONOCYTES NFR BLD AUTO: 9 %
NEUTROPHILS # BLD AUTO: 3.8 10E3/UL (ref 1.6–8.3)
NEUTROPHILS NFR BLD AUTO: 59 %
NRBC # BLD AUTO: 0 10E3/UL
NRBC BLD AUTO-RTO: 0 /100
PLATELET # BLD AUTO: 239 10E3/UL (ref 150–450)
RBC # BLD AUTO: 3.68 10E6/UL (ref 3.8–5.2)
WBC # BLD AUTO: 6.5 10E3/UL (ref 4–11)

## 2024-08-01 PROCEDURE — 85025 COMPLETE CBC W/AUTO DIFF WBC: CPT | Mod: ORL | Performed by: NURSE PRACTITIONER

## 2024-08-01 PROCEDURE — 36415 COLL VENOUS BLD VENIPUNCTURE: CPT | Mod: ORL | Performed by: NURSE PRACTITIONER

## 2024-08-01 PROCEDURE — P9603 ONE-WAY ALLOW PRORATED MILES: HCPCS | Mod: ORL | Performed by: NURSE PRACTITIONER

## 2024-10-30 ENCOUNTER — LAB REQUISITION (OUTPATIENT)
Dept: LAB | Facility: CLINIC | Age: 89
End: 2024-10-30
Payer: COMMERCIAL

## 2024-10-30 DIAGNOSIS — R60.0 LOCALIZED EDEMA: ICD-10-CM

## 2024-10-31 LAB
ANION GAP SERPL CALCULATED.3IONS-SCNC: 15 MMOL/L (ref 7–15)
BUN SERPL-MCNC: 38.6 MG/DL (ref 8–23)
CALCIUM SERPL-MCNC: 9.2 MG/DL (ref 8.8–10.4)
CHLORIDE SERPL-SCNC: 99 MMOL/L (ref 98–107)
CREAT SERPL-MCNC: 1.63 MG/DL (ref 0.51–0.95)
EGFRCR SERPLBLD CKD-EPI 2021: 29 ML/MIN/1.73M2
GLUCOSE SERPL-MCNC: 94 MG/DL (ref 70–99)
HCO3 SERPL-SCNC: 26 MMOL/L (ref 22–29)
POTASSIUM SERPL-SCNC: 4.1 MMOL/L (ref 3.4–5.3)
SODIUM SERPL-SCNC: 140 MMOL/L (ref 135–145)

## 2024-10-31 PROCEDURE — 80048 BASIC METABOLIC PNL TOTAL CA: CPT | Mod: ORL | Performed by: NURSE PRACTITIONER

## 2024-10-31 PROCEDURE — 36415 COLL VENOUS BLD VENIPUNCTURE: CPT | Mod: ORL | Performed by: NURSE PRACTITIONER

## 2024-10-31 PROCEDURE — P9603 ONE-WAY ALLOW PRORATED MILES: HCPCS | Mod: ORL | Performed by: NURSE PRACTITIONER

## 2024-11-20 ENCOUNTER — LAB REQUISITION (OUTPATIENT)
Dept: LAB | Facility: CLINIC | Age: 89
End: 2024-11-20
Payer: COMMERCIAL

## 2024-11-20 DIAGNOSIS — I13.0 HYPERTENSIVE HEART AND CHRONIC KIDNEY DISEASE WITH HEART FAILURE AND STAGE 1 THROUGH STAGE 4 CHRONIC KIDNEY DISEASE, OR UNSPECIFIED CHRONIC KIDNEY DISEASE (H): ICD-10-CM

## 2024-11-21 LAB
ANION GAP SERPL CALCULATED.3IONS-SCNC: 11 MMOL/L (ref 7–15)
BASOPHILS # BLD AUTO: 0.1 10E3/UL (ref 0–0.2)
BASOPHILS NFR BLD AUTO: 1 %
BUN SERPL-MCNC: 31.4 MG/DL (ref 8–23)
CALCIUM SERPL-MCNC: 9.8 MG/DL (ref 8.8–10.4)
CHLORIDE SERPL-SCNC: 99 MMOL/L (ref 98–107)
CREAT SERPL-MCNC: 1.48 MG/DL (ref 0.51–0.95)
EGFRCR SERPLBLD CKD-EPI 2021: 33 ML/MIN/1.73M2
EOSINOPHIL # BLD AUTO: 0.6 10E3/UL (ref 0–0.7)
EOSINOPHIL NFR BLD AUTO: 7 %
ERYTHROCYTE [DISTWIDTH] IN BLOOD BY AUTOMATED COUNT: 14.3 % (ref 10–15)
GLUCOSE SERPL-MCNC: 90 MG/DL (ref 70–99)
HCO3 SERPL-SCNC: 31 MMOL/L (ref 22–29)
HCT VFR BLD AUTO: 38.8 % (ref 35–47)
HGB BLD-MCNC: 12.3 G/DL (ref 11.7–15.7)
IMM GRANULOCYTES # BLD: 0 10E3/UL
IMM GRANULOCYTES NFR BLD: 0 %
LYMPHOCYTES # BLD AUTO: 2.2 10E3/UL (ref 0.8–5.3)
LYMPHOCYTES NFR BLD AUTO: 27 %
MAGNESIUM SERPL-MCNC: 2.5 MG/DL (ref 1.7–2.3)
MCH RBC QN AUTO: 31.1 PG (ref 26.5–33)
MCHC RBC AUTO-ENTMCNC: 31.7 G/DL (ref 31.5–36.5)
MCV RBC AUTO: 98 FL (ref 78–100)
MONOCYTES # BLD AUTO: 0.8 10E3/UL (ref 0–1.3)
MONOCYTES NFR BLD AUTO: 10 %
NEUTROPHILS # BLD AUTO: 4.3 10E3/UL (ref 1.6–8.3)
NEUTROPHILS NFR BLD AUTO: 55 %
NRBC # BLD AUTO: 0 10E3/UL
NRBC BLD AUTO-RTO: 0 /100
PLATELET # BLD AUTO: 276 10E3/UL (ref 150–450)
POTASSIUM SERPL-SCNC: 4.3 MMOL/L (ref 3.4–5.3)
RBC # BLD AUTO: 3.95 10E6/UL (ref 3.8–5.2)
SODIUM SERPL-SCNC: 141 MMOL/L (ref 135–145)
WBC # BLD AUTO: 8 10E3/UL (ref 4–11)

## 2024-11-21 PROCEDURE — 83735 ASSAY OF MAGNESIUM: CPT | Mod: ORL | Performed by: NURSE PRACTITIONER

## 2024-11-21 PROCEDURE — 36415 COLL VENOUS BLD VENIPUNCTURE: CPT | Mod: ORL | Performed by: NURSE PRACTITIONER

## 2024-11-21 PROCEDURE — P9603 ONE-WAY ALLOW PRORATED MILES: HCPCS | Mod: ORL | Performed by: NURSE PRACTITIONER

## 2024-11-21 PROCEDURE — 80048 BASIC METABOLIC PNL TOTAL CA: CPT | Mod: ORL | Performed by: NURSE PRACTITIONER

## 2024-11-21 PROCEDURE — 85025 COMPLETE CBC W/AUTO DIFF WBC: CPT | Mod: ORL | Performed by: NURSE PRACTITIONER

## 2024-11-25 ENCOUNTER — LAB REQUISITION (OUTPATIENT)
Dept: LAB | Facility: CLINIC | Age: 89
End: 2024-11-25
Payer: COMMERCIAL

## 2024-11-25 DIAGNOSIS — I50.32 CHRONIC DIASTOLIC (CONGESTIVE) HEART FAILURE (H): ICD-10-CM

## 2024-11-26 LAB
ANION GAP SERPL CALCULATED.3IONS-SCNC: 14 MMOL/L (ref 7–15)
BUN SERPL-MCNC: 32.7 MG/DL (ref 8–23)
CALCIUM SERPL-MCNC: 9.8 MG/DL (ref 8.8–10.4)
CHLORIDE SERPL-SCNC: 98 MMOL/L (ref 98–107)
CREAT SERPL-MCNC: 1.56 MG/DL (ref 0.51–0.95)
EGFRCR SERPLBLD CKD-EPI 2021: 31 ML/MIN/1.73M2
GLUCOSE SERPL-MCNC: 91 MG/DL (ref 70–99)
HCO3 SERPL-SCNC: 28 MMOL/L (ref 22–29)
HGB BLD-MCNC: 12.5 G/DL (ref 11.7–15.7)
POTASSIUM SERPL-SCNC: 4.4 MMOL/L (ref 3.4–5.3)
SODIUM SERPL-SCNC: 140 MMOL/L (ref 135–145)
WBC # BLD AUTO: 9.4 10E3/UL (ref 4–11)